# Patient Record
Sex: MALE | Race: WHITE | NOT HISPANIC OR LATINO | Employment: FULL TIME | URBAN - METROPOLITAN AREA
[De-identification: names, ages, dates, MRNs, and addresses within clinical notes are randomized per-mention and may not be internally consistent; named-entity substitution may affect disease eponyms.]

---

## 2022-09-28 ENCOUNTER — OFFICE VISIT (OUTPATIENT)
Dept: FAMILY MEDICINE CLINIC | Facility: CLINIC | Age: 47
End: 2022-09-28

## 2022-09-28 VITALS
BODY MASS INDEX: 28.89 KG/M2 | SYSTOLIC BLOOD PRESSURE: 126 MMHG | OXYGEN SATURATION: 98 % | WEIGHT: 218 LBS | DIASTOLIC BLOOD PRESSURE: 82 MMHG | HEART RATE: 98 BPM | HEIGHT: 73 IN

## 2022-09-28 DIAGNOSIS — F41.9 ANXIETY: ICD-10-CM

## 2022-09-28 DIAGNOSIS — R39.12 WEAK URINE STREAM: ICD-10-CM

## 2022-09-28 DIAGNOSIS — R53.83 FATIGUE, UNSPECIFIED TYPE: ICD-10-CM

## 2022-09-28 DIAGNOSIS — Z12.11 COLON CANCER SCREENING: ICD-10-CM

## 2022-09-28 DIAGNOSIS — R21 RASH: ICD-10-CM

## 2022-09-28 DIAGNOSIS — Z13.220 LIPID SCREENING: ICD-10-CM

## 2022-09-28 DIAGNOSIS — R39.11 URINARY HESITANCY: ICD-10-CM

## 2022-09-28 DIAGNOSIS — Z11.59 NEED FOR HEPATITIS C SCREENING TEST: ICD-10-CM

## 2022-09-28 DIAGNOSIS — Z00.00 ANNUAL PHYSICAL EXAM: Primary | ICD-10-CM

## 2022-09-28 PROBLEM — N20.0 KIDNEY STONES: Status: ACTIVE | Noted: 2022-09-28

## 2022-09-28 PROCEDURE — 99386 PREV VISIT NEW AGE 40-64: CPT

## 2022-09-28 PROCEDURE — 99214 OFFICE O/P EST MOD 30 MIN: CPT

## 2022-09-28 RX ORDER — ESCITALOPRAM OXALATE 10 MG/1
10 TABLET ORAL DAILY
Qty: 60 TABLET | Refills: 0 | Status: SHIPPED | OUTPATIENT
Start: 2022-09-28 | End: 2022-11-29 | Stop reason: SDUPTHER

## 2022-09-30 DIAGNOSIS — R79.89 ELEVATED SERUM CREATININE: Primary | ICD-10-CM

## 2022-09-30 LAB
ALBUMIN SERPL-MCNC: 4.4 G/DL (ref 3.5–5.2)
ALBUMIN/GLOB SERPL: 1.8 G/DL
ALP SERPL-CCNC: 89 U/L (ref 39–117)
ALT SERPL-CCNC: 24 U/L (ref 1–41)
AST SERPL-CCNC: 21 U/L (ref 1–40)
BASOPHILS # BLD AUTO: 0.03 10*3/MM3 (ref 0–0.2)
BASOPHILS NFR BLD AUTO: 0.5 % (ref 0–1.5)
BILIRUB SERPL-MCNC: 0.3 MG/DL (ref 0–1.2)
BUN SERPL-MCNC: 13 MG/DL (ref 6–20)
BUN/CREAT SERPL: 9.9 (ref 7–25)
CALCIUM SERPL-MCNC: 9.7 MG/DL (ref 8.6–10.5)
CHLORIDE SERPL-SCNC: 102 MMOL/L (ref 98–107)
CHOLEST SERPL-MCNC: 178 MG/DL (ref 0–200)
CO2 SERPL-SCNC: 28 MMOL/L (ref 22–29)
CREAT SERPL-MCNC: 1.31 MG/DL (ref 0.76–1.27)
EGFRCR SERPLBLD CKD-EPI 2021: 67.6 ML/MIN/1.73
EOSINOPHIL # BLD AUTO: 0.22 10*3/MM3 (ref 0–0.4)
EOSINOPHIL NFR BLD AUTO: 3.9 % (ref 0.3–6.2)
ERYTHROCYTE [DISTWIDTH] IN BLOOD BY AUTOMATED COUNT: 12.7 % (ref 12.3–15.4)
GLOBULIN SER CALC-MCNC: 2.5 GM/DL
GLUCOSE SERPL-MCNC: 107 MG/DL (ref 65–99)
HBA1C MFR BLD: 5.3 % (ref 4.8–5.6)
HCT VFR BLD AUTO: 48.2 % (ref 37.5–51)
HCV AB S/CO SERPL IA: <0.1 S/CO RATIO (ref 0–0.9)
HDLC SERPL-MCNC: 27 MG/DL (ref 40–60)
HGB BLD-MCNC: 16.2 G/DL (ref 13–17.7)
IMM GRANULOCYTES # BLD AUTO: 0.02 10*3/MM3 (ref 0–0.05)
IMM GRANULOCYTES NFR BLD AUTO: 0.4 % (ref 0–0.5)
LDLC SERPL CALC-MCNC: 107 MG/DL (ref 0–100)
LYMPHOCYTES # BLD AUTO: 1.01 10*3/MM3 (ref 0.7–3.1)
LYMPHOCYTES NFR BLD AUTO: 17.8 % (ref 19.6–45.3)
MCH RBC QN AUTO: 30.9 PG (ref 26.6–33)
MCHC RBC AUTO-ENTMCNC: 33.6 G/DL (ref 31.5–35.7)
MCV RBC AUTO: 91.8 FL (ref 79–97)
MONOCYTES # BLD AUTO: 0.51 10*3/MM3 (ref 0.1–0.9)
MONOCYTES NFR BLD AUTO: 9 % (ref 5–12)
NEUTROPHILS # BLD AUTO: 3.89 10*3/MM3 (ref 1.7–7)
NEUTROPHILS NFR BLD AUTO: 68.4 % (ref 42.7–76)
NRBC BLD AUTO-RTO: 0 /100 WBC (ref 0–0.2)
PLATELET # BLD AUTO: 200 10*3/MM3 (ref 140–450)
POTASSIUM SERPL-SCNC: 4.3 MMOL/L (ref 3.5–5.2)
PROT SERPL-MCNC: 6.9 G/DL (ref 6–8.5)
PSA SERPL-MCNC: 1.05 NG/ML (ref 0–4)
RBC # BLD AUTO: 5.25 10*6/MM3 (ref 4.14–5.8)
SODIUM SERPL-SCNC: 140 MMOL/L (ref 136–145)
TESTOST SERPL-MCNC: 589 NG/DL (ref 264–916)
TRIGL SERPL-MCNC: 252 MG/DL (ref 0–150)
TSH SERPL DL<=0.005 MIU/L-ACNC: 1.49 UIU/ML (ref 0.27–4.2)
VLDLC SERPL CALC-MCNC: 44 MG/DL (ref 5–40)
WBC # BLD AUTO: 5.68 10*3/MM3 (ref 3.4–10.8)

## 2022-10-07 ENCOUNTER — PATIENT ROUNDING (BHMG ONLY) (OUTPATIENT)
Dept: FAMILY MEDICINE CLINIC | Facility: CLINIC | Age: 47
End: 2022-10-07

## 2022-10-07 NOTE — PROGRESS NOTES
A Lela message has been sent to the patient for PATIENT [place] [facility]

## 2022-10-31 ENCOUNTER — E-VISIT (OUTPATIENT)
Dept: FAMILY MEDICINE CLINIC | Facility: TELEHEALTH | Age: 47
End: 2022-10-31
Payer: COMMERCIAL

## 2022-10-31 DIAGNOSIS — J40 BRONCHITIS: Primary | ICD-10-CM

## 2022-10-31 PROCEDURE — BRIGHTMDVISIT: Performed by: NURSE PRACTITIONER

## 2022-10-31 RX ORDER — METHYLPREDNISOLONE 4 MG/1
TABLET ORAL
Qty: 21 TABLET | Refills: 0 | Status: SHIPPED | OUTPATIENT
Start: 2022-10-31

## 2022-10-31 NOTE — EXTERNAL PATIENT INSTRUCTIONS
Note   Bronchitis is viral, so an antibiotic will not help. I have prescribed Mucinex, Tessalon Perles and an inhaler, along with a Medrol dose pack (steroids)   Diagnosis   Acute bronchitis (chest cold)   My name is Keisha Lofton, and I'm a healthcare provider at [facility]. Based on your responses, I see that you have a chest cold, also known as acute bronchitis.   To prevent the spread of illness to others, I recommend that you stay home and away from other people as much as possible while you're sick. When you need to be around other people, consider wearing a face mask.   Here are the main things to know about your current symptoms:    Acute bronchitis gets better on its own within 1 to 3 weeks.    The medications I recommend here will help ease your symptoms.   Based on what you told me in your interview, I haven't prescribed any antibiotics. Antibiotics fight bacteria, not viruses. Your bronchitis is caused by a virus, so antibiotics won't help. They could even make you feel worse as they can cause or worsen nausea, diarrhea, and stomach pain. The following factors suggest that a virus is causing your symptoms:    You've had symptoms for less than 10 days. A bacterial infection is suspected when you've had symptoms longer than 10 days without improvement.    You don't have a sore throat. A bacterial infection like strep throat causes a severe sore throat.    You haven't had a fever. Bacterial infections can cause a high fever.    Your nasal discharge is thin.   Medications   Your pharmacy   [facility] [id number] [id number] [phone]     Prescription   Albuterol sulfate HFA (90mcg/puff): Inhale 2 puffs every 6 hours as needed for wheezing. If symptoms are severe, may use as often as every 4 hours.   Benzonatate (200mg): Take 1 capsule by mouth 3 times a day as needed for cough. Do not chew or cut these capsules.   Mucus Relief ER oral tablet, extended release (600mg): Take  1 tablet by mouth every 12 hours as needed for cough and congestion.   About your diagnosis   The bronchial tubes carry air to the lungs. Acute bronchitis happens when these tubes become irritated and inflamed. Smoke and air pollution can cause acute bronchitis, but it's usually caused by a virus. The viruses that cause the common cold or the flu can also cause bronchitis.   Common symptoms of acute bronchitis include coughing up mucus or phlegm, wheezing, and chest tightness. Cold symptoms along with fatigue, body aches, difficulty sleeping, or decreased appetite, are also common.   What to expect   If you follow this treatment plan, you should feel better in 1 to 3 weeks.   When to seek care   Call us at [phone]   with any sudden or unexpected symptoms.    Fever that measures over 103F or continues for more than 3 days.    A worsening headache.    You develop severe shortness of breath or chest tightness.    Your sinus pain continues for 10 days or more, without improvement.    Swallowing becomes extremely difficult or impossible.    More than 5 episodes of diarrhea in a day.    More than 5 episodes of vomiting in a day.    Coughing up red or bloody mucus.    Severe stomach pain.    Symptoms that get better for a few days, and then suddenly get worse.   You mentioned having chest pain. If you develop any of the following, call 911 or go immediately to your nearest emergency room:    A feeling of pressure on your chest    Pain that gets worse with physical activity    Pain that feels like it is radiating to the shoulders, neck, arm, or jaw   Other treatment    Rest! Your body needs rest to recover and fight infection.    Drink plenty of water to stay hydrated.    Use a clean humidifier or a cool-mist vaporizer in your room at night. Breathing humid air may help you feel better.    Try non-prescription saline nasal sprays to help your nasal symptoms.    Try using a Neti Pot to flush out your stuffy nose and  sinuses. Neti Pots are available at any drugstore without a prescription.    Gargle with salt water several times a day to help your throat feel better. Cough drops and throat lozenges may provide extra relief. A teaspoon of honey stirred into warm water or weak tea can help soothe a sore throat and cough.    Avoid smoke and air pollution. Smoke can make infections worse. You mentioned that you smoke every day. I encourage you to quit, even for a week or two. Quitting will help your symptoms improve faster.   Prevention    Avoid close contact with other people when you're sick.    Cover your mouth and nose when you cough or sneeze. Use a tissue or cough into your elbow. Make sure that used tissues go directly into the trash.    Avoid touching your eyes, nose, or mouth while you're sick.    Wash your hands often, especially after coughing, sneezing, or blowing your nose. If soap and water are not available, use an alcohol-based hand .    If you or someone in your home or workplace is sick, disinfect commonly used items. This includes door handles, tables, computers, remotes, and pens.    Coronavirus (COVID-19) information   Common symptoms of COVID-19 include fever, cough, shortness of breath, fatigue, muscle or body aches, headaches, new loss of sense of taste or smell, sore throat, stuffy or runny nose, nausea or vomiting, and diarrhea. Most people who get COVID-19 have mild symptoms and can rest at home until they get better. Elderly people and those with chronic medical problems may be at risk for more serious complications.   FAQs about the COVID-19 vaccine   There are four authorized COVID-19 vaccines: Mello & Mello's Xenia Vaccine (J&J/Xenia), Moderna, Novavax, and Pfizer-Dipe[employer] (Pfizer). The J&J/Xenia and Novavax vaccines are approved for use in people aged 18 and older. The Moderna and Pfizer vaccines are approved for those aged 6 months and older. All four are available at no cost. Even  if you don't have health insurance, you can still get the COVID-19 vaccine for free.   Which vaccine is the best? Which vaccine should I get?   All four vaccines are highly effective. Even if you get COVID-19 after being vaccinated, all of the vaccines help prevent severe disease, hospitalization, and complications.   Most people should get whichever vaccine is first available to them. However, women younger than 50 years old should consider the rare risk of blood clots with low platelets after vaccination with the J&J/Updox vaccine. This risk hasn't been seen with the other three vaccines.   Are the vaccines safe?   Yes. Hundreds of millions of people in the [place] have already safely received COVID-19 vaccines under the most intense safety monitoring in the history of the [place].   Do I need the vaccine if I've already had COVID?   Yes. Vaccination helps protect you even if you've already had COVID.   If you had COVID-19 and had symptoms, wait to get vaccinated until you've recovered and completed your isolation period.   If you tested positive for COVID-19 but did not have symptoms, you can get vaccinated after 5 full days have passed since you had a positive test, as long as you don't develop symptoms.   How many doses of the vaccine do I need?   Visit www.cdc.gov/coronavirus/2019-ncov/vaccines/stay-up-to-date.html   to find out how to stay up to date with your COVID-19 vaccines.   I'm immunocompromised. How many doses of the vaccine do I need?   For information on how immunocompromised people can stay up to date with their COVID-19 vaccines, visit www.cdc.gov/coronavirus/2019-ncov/vaccines/recommendations/immuno.html  .   What are the common side effects of the vaccine?   A sore arm, tiredness, headache, and muscle pain may occur within two days of getting the vaccine and last a day or two. For the Moderna or Pfizer vaccines, side effects are more common after the second dose. People over the age of 55 are less  likely to have side effects than younger people.   After I'm up to date on vaccines, can I still get or spread COVID?   Yes, you can still get COVID, but your disease should be milder. And your risk of serious illness, hospitalization, and complications will be much lower, especially if you're up to date. Unfortunately, you can still spread COVID if you've been vaccinated. That's why it's important to follow isolation guidelines if you get sick or test positive.   After I'm up to date on vaccines, can I go back to normal?   You should still wear a mask indoors in public if:    It's required by laws, rules, regulations, or local guidance.    You have a weakened immune system.    Your age puts you at increased risk of severe disease.    You have a medical condition that puts you at increased risk of severe disease.    Someone in your household has a weakened immune system, is at increased risk for severe disease, or is unvaccinated.    You're in an area of high transmission.   Where can I get a COVID-19 vaccine?   Visit [facility]'s website for more information. To find a COVID-19 vaccination site near you, visit www.vaccines.gov/  , call [phone]  , or text your zip code to 228496 (MyAppConverter). Message and data rates may apply.   What about travel?   Travel increases your risk of exposure to COVID-19. For more information, see www.cdc.gov/coronavirus/2019-ncov/travelers/index.html  .   I've had close contact with someone who has COVID. Do I need to quarantine, and if so, for how long?   For the most current answer, including a calculator to determine whether you need to stay home and for how long, visit www.cdc.gov/coronavirus/2019-ncov/your-health/quarantine-isolation.html  .   I've tested positive for COVID. How long do I need to isolate?   For the latest recommendations, including a calculator to determine how long you need to stay home, visit  www.cdc.gov/coronavirus/2019-ncov/your-health/quarantine-isolation.html  .   What if I develop symptoms that might be from COVID?   For the latest recommendations on what to do if you're sick, including when to seek emergency care, visit www.cdc.gov/coronavirus/2019-ncov/if-you-are-sick/steps-when-sick.html  .    Flu vaccine information   Who should get a flu vaccine?   Everyone 6 months of age and older should get a yearly flu vaccine.   When should I get vaccinated?   You should get a flu vaccine by the end of October. Once you're vaccinated, it takes about two weeks for antibodies to develop and protect you against the flu. That's why it's important to get vaccinated as soon as possible.   After October, is it too late to get vaccinated?   No. You should still get vaccinated. As long as the flu viruses are still in your community, flu vaccines will remain available, even into January of next year or later.   Why do I need a flu vaccine EVERY year?   Flu viruses are constantly changing, so flu vaccines are usually updated from one season to the next. Your protection from the flu vaccine also lessens over time.   Is the flu vaccine safe?   Yes. Over the last 50 years, hundreds of millions of Americans have safely received the flu vaccines.   What are the side effects of flu vaccines?   You CANNOT get the flu from a flu vaccine. Common side effects of the flu shot include soreness, redness and/or swelling where the shot was given, low grade fever, and aches. Common side effects of the nasal spray flu vaccine for adults include runny nose, headaches, sore throat, and cough. For children, side effects include wheezing, vomiting, muscle aches, and fever.   Does the flu vaccine increase your risk of getting COVID-19?   No. There is no evidence that getting a flu vaccine increases your risk of getting COVID-19.   Is it safe to get the flu vaccine along with a COVID-19 vaccine?   Yes. It's safe to get the flu vaccine with  a COVID-19 vaccine or booster.   Contact your healthcare provider TODAY for details on when and where to get your flu vaccine.   Your provider   Your diagnosis was provided by Keisha Lofton, a member of your trusted care team at [facility].   If you have any questions, call us at [phone]  .

## 2022-10-31 NOTE — E-VISIT TREATED
Chief Complaint: Coronavirus (COVID-19), cold, sinus pain, allergy, or flu   Patient introduction   Patient is 47-year-old male with cough, congestion, nasal discharge, sweats, chills, myalgia, fatigue, and diarrhea that started 3 to 5 days ago. Regarding date of symptom onset, patient writes: 10/27/22.   COVID-19 exposure, testing history, and vaccination status:    No known exposure to a person with a confirmed or suspected case of COVID-19.    No recent travel outside of their local community.    Patient had a self-test within the last week. Patient specifies date of test as 10/30/22. Test result was negative.    Received 2 doses of the COVID-19 vaccine (Pfizer, Pfizer).   Received their most recent dose of the vaccine more than 14 days ago.   Risk factors for severe disease from COVID-19 infection:    BMI >= 25.    Smokes tobacco daily.   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   Patient did not request an excuse note.   General presentation   Symptoms came on suddenly.   Fever:    No fever.   Sinus and nasal symptoms:    Nasal discharge.    Yellow nasal drainage.    Nasal drainage is thin.    Congestion with sinus pain or pressure on or around the nose.    Patient first noticed sinus pain less than 5 days ago.    Sinus pain is worse with Valsalva.    No itchy nose or sneezing.    No postnasal drip.    No history of unhealed nasal septal ulcer/nasal wound.    No history of  antibiotic treatment for sinus infection in the last year.    No history of deviated septum or nasal polyps.   Throat symptoms:    No sore throat.   Head and body aches:    Sweats.    Chills.    Myalgia.    Fatigue.    No headache.   Cough:    Cough is worse in the morning.    Cough is not productive of sputum.   Wheezing and shortness of breath:    Has previously used an albuterol inhaler for URI, bronchitis, or pneumonia.    Has previously used a steroid inhaler for URI, bronchitis, or pneumonia.    Not using an albuterol inhaler for current symptoms because they do not have any available.    No COPD diagnosis.    No asthma diagnosis.    No wheezing.    No shortness of breath.   Chest pain:    Has chest pain, but only when coughing.    Chest pain is not the patient's chief complaint.    [place] Heart Score (MHS): 0, low risk of CAD. Assigning 1 point to each of 5 criteria (female >= 65 years old or male >= 55 years, known CAD, pain worse with exercise, pain not reproducible with palpation, and patient assumes pain is cardiogenic), the MHS is a validated clinical decision rule used to rule out coronary artery disease in primary care patients with chest pain.   Ear symptoms:    None.   Dizziness:    Mild dizziness that does not interfere with daily activities.   Allergies:    No history of allergies.   Flu exposure:    Recent distant-proximity exposure to a person with a confirmed flu diagnosis.    Has not had a flu vaccine this season.   Patient is taking over-the-counter medications for current symptoms, including acetaminophen and dextromethorphan.   Review of red flags/alarm symptoms:    No changes in alertness or awareness.    No decreased urination.   Risk factors for antibiotic resistance:    Smokes tobacco daily.   Self-exam:    Height: 185 centimeters    Weight: 94.3 kilograms    No difficulty moving their chin toward their chest.    Neck lymph nodes feel normal.    Has not taken antibiotics for similar  symptoms within the past month.   Current medications   Currently taking escitalopram 10 MG tablet.   Medication allergies   None.   Medication contraindication review   No history of anaphylactic reaction to beta-lactam antibiotics; aspirin triad; blood dyscrasia; bone marrow depression; catecholamine-releasing paraganglioma; coronary artery disease; coagulation disorder; congenital long QT syndrome; depression; electrolyte abnormalities; fungal infection; GI bleeding; GI obstruction; G6PD deficiency; heart arrhythmia; hypertension; mononucleosis; myasthenia; recent myocardial infarction; NSAID-induced asthma/urticaria; Parkinson's disease; pheochromocytoma; porphyria; Reye syndrome; seizure disorder; ulcerative colitis; and urinary retention.   No history of metoclopramide-associated dystonic reaction and tardive dyskinesia.   No known history of amoxicillin-clavulanate-associated cholestatic jaundice or hepatic impairment.   No known history of azithromycin-associated cholestatic jaundice or hepatic impairment.   Past medical history   Immune conditions: No immunocompromising conditions. No history of cancer.   Social history   Smokes tobacco daily.   Assessment   Acute bronchitis. Ruled out: Traumatic laryngitis.   This is the likely diagnosis based on patient's interview responses   Plan   Medications:    albuterol sulfate HFA 90 mcg/actuation aerosol inhaler RX 90mcg/puff 2 puffs inhaled q6h PRN 10d for wheezing. If symptoms are severe, may use as often as every 4 hours. Amount is 18 g.    benzonatate 200 mg capsule RX 200mg 1 cap PO tid PRN 7d for cough. Do not chew or cut these capsules. Amount is 21 cap.    Mucus Relief  mg tablet, extended release RX 600mg 1 tab PO q12h PRN 7d for cough and congestion. Amount is 14 tab.   The patient's prescriptions will be sent to:   [facility] [id number]   [id number] [address]   Phone: [phone]     Fax: [phone]   Education:     Condition and causes    Prevention    Treatment and self-care    When to call provider   ----------   Electronically signed by LUIS Scott on 2022-10-31 at 15:30PM   ----------   Patient Interview Transcript:   Please carefully consider each question and answer as best you can. This helps your provider give you the best care. Which of these symptoms are bothering you? Select all that apply.    Cough    Stuffed-up nose or sinuses    Runny nose    Sweats    Chills    Muscle or body aches    Fatigue or tiredness    Diarrhea   Not selected:    Shortness of breath    Fever    Itchy or watery eyes    Itchy nose or sneezing    Loss of smell or taste    Sore throat    Hoarse voice or loss of voice    Headache    Nausea or vomiting    I don't have any of these symptoms   Since your current symptoms started, have you been tested for COVID-19? Select one.    Yes   Not selected:    No   When was your most recent COVID-19 test? Select one.    Within the last week (specify date as MM/DD/YY): 10/30/22   Not selected:    7 to 14 days ago    15 to 30 days ago    More than 1 month ago   What type of COVID-19 test did you most recently have? There are two types of COVID-19 tests: - Viral tests check if you're currently infected with COVID-19. For these tests, a nose swab or saliva sample is taken. Viral tests include self-tests and tests done at a doctor's office, lab, or testing site. - Antibody tests check if you've been infected in the past. For these tests, your blood is drawn. Antibody tests can only be done at a doctor's office, lab, or testing site. Select one.    Viral self-test   Not selected:    Viral test at a doctor's office, lab, or testing site    Antibody test   What was the result of your most recent COVID-19 test? Select one.    Negative   Not selected:    Positive    I'm not sure   Have you gotten the COVID-19 vaccine? Select one.    Yes   Not selected:    No   How many total doses of the COVID-19 vaccine have  "you gotten? This includes boosters as well as additional doses for those who are immunocompromised. Select one.    2 doses   Not selected:    1 dose    3 doses    4 doses    5 doses   1st dose    Pfizer   Not selected:    J&J/Xenia    Moderna    Novavax   2nd dose    Pfizer   Not selected:    J&J/Xenia    Moderna    Novavax   When did you get your most recent dose of the COVID-19 vaccine?    More than 14 days ago   Not selected:    Less than 48 hours (2 days) ago    48 to 72 hours (3 days) ago    3 to 5 days ago    5 to 7 days ago    7 to 14 days ago   In the last 14 days, have you traveled outside of your local community? This includes travel by car, RV, bus, train, or plane. Travel increases your chances of getting and spreading COVID-19. Select one.    No   Not selected:    Yes   In the last 14 days, have you had close contact with someone who has coronavirus (COVID-19)? \"Close contact\" means any of these: - Living in the same household as someone with COVID-19. - Caring for someone with COVID-19. - Being within 6 feet of someone with COVID-19 for a total of at least 15 minutes over a 24-hour period. For example, three 5-minute exposures for a total of 15 minutes. - Being in direct contact with respiratory droplets from someone with COVID-19 (being coughed on, kissing, sharing utensils). Select one.    No, not that I know of   Not selected:    Yes, a confirmed case    Yes, a suspected case   When did your current symptoms start? Select one.    3 to 5 days ago   Not selected:    Less than 48 hours ago    6 to 9 days ago    10 to 14 days ago    2 to 4 weeks ago    More than a month ago   Do you know the exact date your symptoms started? If so, enter the date as MM/DD/YY. Select one.    Yes (specify): 10/27/22   Not selected:    No   Did your symptoms come on suddenly or gradually? Select one.    Suddenly   Not selected:    Gradually    I'm not sure   Do you cough so hard that it's made you gag or vomit? By gag, " "we mean has your coughing made you choke or dry heave? Select all that apply.    Yes, my coughing has made me gag    Yes, my coughing has made me vomit   Not selected:    No   When is your cough the worst? Select all that apply.    In the morning, or when I wake up   Not selected:    During the day    At nighttime, or while I'm sleeping    I haven't noticed a difference depending on time of day   Are you coughing up mucus or phlegm? Select one.    No, my cough is dry   Not selected:    Yes, a little    Yes, a lot   You mentioned having a stuffy nose or sinus congestion. Do you feel pain or pressure in your sinuses?    Yes   Not selected:    No   Where do you feel sinus pain or pressure?    Behind my nose   Not selected:    In my forehead    Around my eyes    In my cheeks    In my upper teeth or jaw    I'm not sure   When did you first notice your sinus pain or pressure? Select one.    Less than 5 days ago   Not selected:    5 to 9 days ago    10 to 14 days ago    2 to 4 weeks ago    1 month ago or longer   Does coughing, sneezing, or leaning forward make your sinuses feel worse? Select one.    Yes   Not selected:    No   What color is your nasal drainage? Select one.    Yellow   Not selected:    Clear    White    Green    My nose is stuffed but not draining or running    I'm not sure   Is your nasal drainage thick or thin? Select one.    Thin   Not selected:    Thick   Is there any drainage (mucus) going down the back of your throat? This kind of drainage is also called \"postnasal drip.\" Select one.    No, not that I know of   Not selected:    Yes   Since your symptoms started, have you felt dizzy? Select one.    Yes, but I can continue with my regular daily activities   Not selected:    Yes, and it makes it hard to stand, walk, or do daily activities    No   Do you have chest pain? You might also feel it as discomfort, aching, tightness, or squeezing in the chest. Select one.    Yes   Not selected:    No   Which of " these is true of your chest pain? Select one.    My chest hurts only when I cough   Not selected:    My chest hurts even when I'm not coughing   Have you urinated at least 3 times in the last 24 hours? Select one.    Yes   Not selected:    No   Changes in alertness or awareness may mean you need emergency care. Since your symptoms started, have you had any of these? Select all that apply.    None of the above   Not selected:    Confusion    Slurred speech    Not knowing where you are or what day it is    Difficulty staying conscious    Fainting or passing out   Do your symptoms include a whistling sound, or wheezing, when you breathe? Select one.    No   Not selected:    Yes    I'm not sure   Do you have any of these symptoms in your ear(s)? Select all that apply.    None of the above   Not selected:    Pain    Pressure    Fullness    Crackling or popping    Plugged or blocked sensation   Can you move your chin toward your chest?    Yes   Not selected:    No, my neck is too stiff   Are your glands/lymph nodes swollen, or does it hurt when you touch them?    No, not that I can tell   Not selected:    Yes   In the past week, has anyone around you (such as at school, work, or home) had a confirmed diagnosis of the flu? A confirmed diagnosis means that a nose swab was done to verify a flu infection. Select all that apply.    I've been in the same building as someone who has the flu   Not selected:    I live with someone who has the flu    I've been within touching distance of someone who has the flu    I've walked by, or sat about 3 feet away from, someone who has the flu    No, not that I know of   Have you ever been diagnosed with asthma? Select one.    No   Not selected:    Yes   Have you ever been prescribed albuterol to use for wheezing, cough, or shortness of breath caused by a cold, bronchitis, or pneumonia? Albuterol (ProAir, Proventil, Ventolin) is prescribed as an inhaler or a solution to be used with a  nebulizer machine. Select one.    Yes   Not selected:    No, not that I know of   Have you ever been prescribed a steroid inhaler to use for wheezing, cough, or shortness of breath caused by a cold, bronchitis, or pneumonia? Some examples of steroid inhalers include Pulmicort, Flovent, Qvar, and Alvesco. Select one.    Yes   Not selected:    No, not that I know of   Have you used albuterol for your current symptoms? This includes both albuterol inhalers and albuterol solution in a nebulizer machine. Select one.    No, I don't have any, or it's    Not selected:    Yes    No, I don't feel like I need it   Do you need a refill of albuterol? Select one.    No   Not selected:    Yes   Have you ever been diagnosed with chronic obstructive pulmonary disease (COPD)? Select one.    No, not that I know of   Not selected:    Yes   In the past month, have you taken antibiotics for similar symptoms? Examples of antibiotics include amoxicillin, amoxicillin-clavulanate (Augmentin), penicillin, cefdinir (Omnicef), doxycycline, and clindamycin (Cleocin). Select one.    No   Not selected:    Yes   In the last year, how many times were you treated with antibiotics for a sinus infection? Select one.    None   Not selected:    1 to 3 times    4 or more times   Have you been diagnosed with a deviated septum or nasal polyps? The nose is divided into two nostrils by the septum. A crooked septum is called a deviated septum. Nasal polyps are growths inside the nose or sinuses. Select one.    No, not that I know of   Not selected:    Yes, but I had surgery to treat them    Yes, I have a deviated septum    Yes, I have nasal polyps    Yes, I have a deviated septum and nasal polyps   Do you have a sore inside your nose that won't heal? Select one.    No, not that I know of   Not selected:    Yes   Do you have allergies (pollen, dust mites, mold, animal dander)? Select one.    No, not that I know of   Not selected:    Yes   Have you had a  flu shot this season? Select one.    No, not that I know of   Not selected:    Yes, less than 2 weeks ago    Yes, 2 to 4 weeks ago    Yes, 1 to 3 months ago    Yes, 3 to 6 months ago    Yes, more than 6 months ago   The flu and COVID-19 can be more serious for people with certain conditions or characteristics. These questions help us figure out if you or anyone you live with is at higher risk for complications from these infections. Do either of these statements apply to you? Select all that apply.    None of the above   Not selected:    I'm  or Native [place]    I'm a healthcare worker   Do you smoke tobacco? Select one.    Yes, every day   Not selected:    Yes, some days    No, I quit    No   Do you have any of these conditions? Select all that apply.    None of the above   Not selected:    Chronic lung disease, such as cystic fibrosis or interstitial fibrosis    Heart disease, such as congenital heart disease, congestive heart failure, or coronary artery disease    Disorder of the brain, spinal cord, or nerves and muscles, such as dementia, cerebral palsy, epilepsy, muscular dystrophy, or developmental delay    Metabolic disorder or mitochondrial disease    Cerebrovascular disease, such as stroke or another condition affecting the blood vessels or blood supply to the brain    Down syndrome    Mood disorder, including depression or schizophrenia spectrum disorders    Substance use disorder, such as alcohol, opioid, or cocaine use disorder    Tuberculosis   Do you live in a group care setting? Examples include: - Nursing home - Residential care - Psychiatric treatment facility - Group home - Dormitory - Board and care home - Homeless shelter - Foster care setting Select one.    No   Not selected:    Yes   Are you a healthcare worker? Select one.    No   Not selected:    Yes   People with a very high body mass index (BMI) are at higher risk for developing complications from the flu and severe illness  from COVID-19. To determine your BMI, we need to know your weight and height. Please enter your weight (in pounds).    Weight   Please enter your height.    Height   Do you have any of these conditions that can affect the immune system? Scroll to see all options. Select all that apply.    None of these   Not selected:    History of bone marrow transplant    Chronic kidney disease    Chronic liver disease (including cirrhosis)    HIV/AIDS    Inflammatory bowel disease (Crohn's disease or ulcerative colitis)    Lupus    Moderate to severe plaque psoriasis    Multiple sclerosis    Rheumatoid arthritis    Sickle cell anemia    Alpha or beta thalassemia    History of solid organ transplant (kidney, liver, or heart)    History of spleen removal    An autoimmune disorder not listed here    A condition requiring treatment with long-term use of oral steroids (such as prednisone, prednisolone, or dexamethasone)   Have you ever been diagnosed with cancer? Select one.    No   Not selected:    Yes, I have cancer now    Yes, but I'm in remission   Do any of these apply to you? Select all that apply.    None of the above   Not selected:    I've been hospitalized within the last 5 days    I have diabetes    I'm in close contact with a child in    Do any of these apply to the people who live with you? Select all that apply.    None of the above   Not selected:    A child under the age of 5    An adult 65 or older    A person who is pregnant    A person who has given birth, had a miscarriage, had a pregnancy loss, or had an  in the last 2 weeks    An  or Native [place]   Does any member of your household have any of these medical conditions? Select all that apply.    None of the above   Not selected:    Asthma    Disorders of the brain, spinal cord, or nerves and muscles, such as dementia, cerebral palsy, epilepsy, muscular dystrophy, or developmental delay    Chronic lung disease, such as COPD or cystic  fibrosis    Heart disease, such as congenital heart disease, congestive heart failure, or coronary artery disease    Cerebrovascular disease, such as stroke or another condition affecting the blood vessels or blood supply to the brain    Blood disorders, such as sickle cell disease    Diabetes    Metabolic disorders such as inherited metabolic disorders or mitochondrial disease    Kidney disorders    Liver disorders    Weakened immune system due to illness or medications such as chemotherapy or steroids    Children under the age of 19 who are on long-term aspirin therapy    Extreme obesity (BMI > 40)   Do you have any of these conditions? Scroll to see all options. Select all that apply.    None of the above   Not selected:    Aspirin triad (also known as Samter's triad or ASA triad)    Asthma or hives from taking aspirin or other NSAIDs, such as ibuprofen or naproxen    Blockage or narrowing of the blood vessels of the heart    Blood dyscrasia, such anemia, leukemia, lymphoma, or myeloma    Bone marrow depression    Catecholamine-releasing paraganglioma    Blood clotting disorder    Congenital long QT syndrome    Depression    Difficulty urinating or completely emptying your bladder    Uncorrected electrolyte abnormalities    Fungal infection    Gastrointestinal (GI) bleeding    Gastrointestinal (GI) obstruction    G6PD deficiency    Recent heart attack    High blood pressure    Irregular heartbeat or heart rhythm    Mononucleosis (mono)    Myasthenia gravis    Parkinson's disease    Pheochromocytoma    Reye syndrome    Seizure disorder    Ulcerative colitis   Have you ever had either of these conditions? Select all that apply.    No   Not selected:    Metoclopramide-associated dystonic reaction    Tardive dyskinesia   Just a few more questions about medications, and then you're finished. Have you used any non-prescription medications or nasal sprays for your current symptoms? Examples include saline sprays,  decongestants, NyQuil, and Tylenol. Select one.    Yes   Not selected:    No   Which of these non-prescription medications have you tried? Scroll to see all options. Select all that apply.    Acetaminophen (Tylenol)    Dextromethorphan (Delsym, Robitussin, Vicks DayQuil Cough)   Not selected:    Budesonide (Rhinocort)    Cetirizine (Zyrtec)    Chlorpheniramine (Aller-chlor, Chlor-Trimeton)    Cromolyn (NasalCrom)    Diphenhydramine (Benadryl)    Fexofenadine (Allegra)    Fluticasone (Flonase)    Guaifenesin (Mucinex)    Guaifenesin/dextromethorphan (Delsym DM, Mucinex DM, Robitussin DM)    Ibuprofen (Advil, Motrin, Midol)    Ketotifen (Alaway, Zaditor)    Loratadine (Alavert, Claritin)    Naphazoline-pheniramine (Naphcon-A, Opcon-A, Visine-A)    Omeprazole (Prilosec)    Oxymetazoline (Afrin)    Phenylephrine (Sudafed)    Triamcinolone (Nasacort)    None of the above   Have you taken any monoamine oxidase inhibitor (MAOI) medications in the last 14 days? Examples include rasagiline (Azilect), selegiline (Eldepryl, Zelapar), isocarboxazid (Marplan), phenelzine (Nardil), and tranylcypromine (Parnate). Select one.    No, not that I know of   Not selected:    Yes   Do you take Kynmobi or Apokyn (apomorphine)? Select one.    No   Not selected:    Yes   Are you still taking these medications listed in your medical record? If you're not taking any of these, click Next. Select all that apply.    escitalopram 10 MG tablet   Are you taking any other medications, vitamins, or supplements? Select one.    No   Not selected:    Yes   Have you ever had an allergic or bad reaction to any medication? Select one.    No   Not selected:    Yes   Are you allergic to milk or to the proteins found in milk (for example, whey or casein)? A milk allergy is different from lactose intolerance. Select one.    No, not that I know of   Not selected:    Yes   Have you ever had jaundice or liver problems as a result of taking amoxicillin-clavulanate  (Augmentin)? Jaundice is a condition in which the skin and the whites of the eyes turn yellow. Select all that apply.    No, not that I know of   Not selected:    Yes, jaundice    Yes, liver problems   Have you ever had jaundice or liver problems as a result of taking azithromycin (Zithromax, Zmax)? Jaundice is a condition in which the skin and the whites of the eyes turn yellow. Select all that apply.    No, not that I know of   Not selected:    Yes, jaundice    Yes, liver problems   Do you need a doctor's note? A doctor's note confirms that you received care today and states when you can return to school or work. It does not contain information about your diagnosis or treatment plan. Your provider will make the final decision on whether to give you a doctor's note and for how long. Doctor's notes CANNOT be backdated. We can't provide medical leave paperwork through this type of visit. If more paperwork is needed to request time off, contact your primary care provider. Select one.    No   Not selected:    Today only (1 day)    Today and tomorrow (2 days)    3 days    5 days    7 days    10 days    14 days   Is there anything else you'd like to tell us about your symptoms?    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

## 2022-11-29 ENCOUNTER — TELEPHONE (OUTPATIENT)
Dept: FAMILY MEDICINE CLINIC | Facility: CLINIC | Age: 47
End: 2022-11-29

## 2022-11-29 DIAGNOSIS — J06.9 URI WITH COUGH AND CONGESTION: Primary | ICD-10-CM

## 2022-11-29 DIAGNOSIS — F41.9 ANXIETY: ICD-10-CM

## 2022-11-29 RX ORDER — ESCITALOPRAM OXALATE 10 MG/1
10 TABLET ORAL DAILY
Qty: 60 TABLET | Refills: 0 | Status: SHIPPED | OUTPATIENT
Start: 2022-11-29

## 2022-11-29 RX ORDER — DEXTROMETHORPHAN HYDROBROMIDE AND PROMETHAZINE HYDROCHLORIDE 15; 6.25 MG/5ML; MG/5ML
5 SYRUP ORAL 4 TIMES DAILY PRN
Qty: 118 ML | Refills: 0 | Status: SHIPPED | OUTPATIENT
Start: 2022-11-29

## 2022-11-29 RX ORDER — GUAIFENESIN 600 MG/1
1200 TABLET, EXTENDED RELEASE ORAL 2 TIMES DAILY
Qty: 20 TABLET | Refills: 0 | Status: SHIPPED | OUTPATIENT
Start: 2022-11-29 | End: 2022-12-04

## 2022-11-29 NOTE — TELEPHONE ENCOUNTER
I have sent in medication for cough and congestion.   I ordered Xray, can  order and take to [facility] or if prefers go to [facility]

## 2022-11-29 NOTE — TELEPHONE ENCOUNTER
JULIANNE IN [facility]. PHARMACY IS IN CHART.   ALSO PT IS REQUESTING TO BE TESTED FOR PNEUMONIA.

## 2022-11-29 NOTE — TELEPHONE ENCOUNTER
Most likely viral as long as no fever. Usually cough may linger for up to 6 weeks but should overall feel better.   I can send in treatment for cough if ok with this. Where does he want it to go?

## 2022-11-29 NOTE — TELEPHONE ENCOUNTER
Caller: Alexis Huntley    Relationship: Self    Best call back number: [phone]    Who are you requesting to speak with (clinical staff, provider,  specific staff member): LUIS MELENDEZ OR MA    What was the call regarding: PATIENT STATES THAT HE DID AN ONLINE SCREENING, AND WAS PRESCRIBED MEDICATION TO TREAT BRONCHITIS. HE COMPLETED THE MEDICATION, AND HIS SYMPTOMS SEEMED TO CLEAR UP. HE RECENTLY WENT OF OF TOWN, AND THE SAME COUGH CAME BACK. HE HAS HAD A SEVERE AND PERSISTENT COUGH FOR ABOUT THE LAST WEEK. REQUESTS A CALL BACK TO DISCUSS FURTHER CONCERNS AND SUGGESTED TREATMENT    Do you require a callback: YES

## 2022-11-29 NOTE — TELEPHONE ENCOUNTER
Caller: Alexis Huntley    Relationship: Self    Best call back number: [phone]    Requested Prescriptions:   Requested Prescriptions     Pending Prescriptions Disp Refills   • escitalopram (Lexapro) 10 MG tablet 60 tablet 0     Sig: Take 1 tablet by mouth Daily.        Pharmacy where request should be sent: [facility] [address] [address] [address] - [phone]  - [phone] FX     Additional details provided by patient: PATIENT STATES THAT HE HAS A FEW DAYS OF MEDICATION REMAINING    Does the patient have less than a 3 day supply:  [x] Yes  [] No    Would you like a call back once the refill request has been completed: [] Yes [x] No    If the office needs to give you a call back, can they leave a voicemail: [] Yes [x] No    Adriana Crenshaw Rep   11/29/22 08:49 EST

## 2022-11-30 ENCOUNTER — TELEPHONE (OUTPATIENT)
Dept: FAMILY MEDICINE CLINIC | Facility: CLINIC | Age: 47
End: 2022-11-30

## 2023-11-20 ENCOUNTER — E-VISIT (OUTPATIENT)
Dept: FAMILY MEDICINE CLINIC | Facility: TELEHEALTH | Age: 48
End: 2023-11-20
Payer: COMMERCIAL

## 2023-11-21 NOTE — E-VISIT TREATED
Chief Complaint: Cold, flu, COVID, sinus, hay fever, or seasonal allergies   Patient introduction   Patient is 48-year-old male with cough, shortness of breath, congestion, nasal discharge, sore throat, headache, chills, and diarrhea that started 6 to 9 days ago. Regarding date of symptom onset, patient writes: 11/12/23.   COVID-19 testing history, vaccination status, and exposure:    Patient did a self-test within the last 24 hours. Test result was negative.    Received 2 doses of the COVID-19 vaccine (Pfizer, Pfizer). Received their most recent dose of the vaccine more than 14 days ago.    No high-risk (household) exposure to COVID-19 within the last 14 days.    No travel outside local community within the last 14 days.   Risk factors for severe disease from COVID-19 infection    Higher risk for severe disease from COVID-19 because of BMI >= 25.    Mostly sedentary lifestyle.    Smokes tobacco daily.   Warning. The following may warrant further investigation:    Headache described as severe (7 to 9 on a scale of 1 to 10).   General presentation   No improvement of symptoms. Symptoms came on suddenly.   Fever:    No fever.   Sinus and nasal symptoms:    Nasal discharge.    Yellow nasal drainage.    Nasal drainage is thick.    Sinus pain or pressure on or around the forehead, eyes, and nose.    Patient first noticed sinus pain 5 to 9 days ago.    Sinus pain is worse with Valsalva.    No itchy nose or sneezing.    No postnasal drip.    No history of unhealed nasal septal ulcer/nasal wound.    No history of antibiotic treatment for sinus infection in the last year.    No history of deviated septum or nasal polyps.   Throat symptoms:    Sore throat.    Patient can swallow liquids and solid foods with ease.   Head and body aches:    Headache described as severe (7 to 9 on a scale of 1 to 10).    Chills.    No sweats.    No myalgia.    No fatigue.   Cough:    Cough is worse in the morning.    Cough is mildly  productive of sputum.    Describes color of sputum as yellow.   Wheezing and shortness of breath:    Moderate shortness of breath that does not affect ADLs. Despite shortness of breath, patient can speak normally. Despite shortness of breath, patient can take a full, deep breath (inhaling for 3 to 4 seconds).    Patient has a home pulse oximeter. Per patient, readings for O2 saturation percentage and heart rate BPM are: 96/100.    Has previously used an albuterol inhaler for URI, bronchitis, or pneumonia.    Not using an albuterol inhaler for current symptoms because they do not have any available.    No COPD diagnosis.    No asthma diagnosis.    No wheezing.    No previous steroid inhaler use for URIs, bronchitis, or pneumonia.   Chest pain:    Has chest pain, but only when coughing.    Chest pain is not the patient's chief complaint.    [place] Heart Score (MHS): 0, low risk of CAD. Assigning 1 point to each of 5 criteria (female >= 65 years old or male >= 55 years, known CAD, pain worse with exercise, pain not reproducible with palpation, and patient assumes pain is coming from their heart), the MHS is a validated clinical decision rule used to rule out coronary artery disease in primary care patients with chest pain.   Ear symptoms:    None.   Dizziness:    Mild dizziness that does not interfere with daily activities.   Allergies:    No history of allergies.   Flu exposure:    No recent known exposure to a person with a confirmed flu diagnosis.    Has not had a flu vaccine this season.   Patient is taking over-the-counter medications for current symptoms, including acetaminophen, dextromethorphan, guaifenesin, guaifenesin/dextromethorphan, ibuprofen, and phenylephrine.   Review of red flags/alarm symptoms:    No severe shortness of breath.    No inability to repeat a sentence without stopping for breath.    No inability to take a full breath lasting 3 to 4 seconds.    No changes in alertness or awareness.    No  "symptoms suggesting airway obstruction.    No symptoms suggesting intracranial hemorrhage.    No decreased urination.   Risk factors for antibiotic resistance:    Smokes tobacco daily.   Self-exam:    Height: 6' 1\"    Weight: 220 lbs    No tonsillar edema.    Tonsils appear normal.    No palatal petechiae.    Neck lymph nodes feel normal.   Recent antibiotic use:    Has not taken antibiotics for similar symptoms within the past month.   Current medications   Not taking other medications or supplements.   Medication allergies   None.   Medication contraindication review   No history of anaphylactic reaction to beta-lactam antibiotics; aspirin triad; blood dyscrasia; bone marrow depression; catecholamine-releasing paraganglioma; coronary artery disease; coagulation disorder; congenital long QT syndrome; depression; electrolyte abnormalities; fungal infection; GI bleeding; GI obstruction; G6PD deficiency; heart arrhythmia; hypertension; mononucleosis; myasthenia; recent myocardial infarction; NSAID-induced asthma/urticaria; Parkinson's disease; pheochromocytoma; porphyria; Reye syndrome; seizure disorder; ulcerative colitis; and urinary retention.   No history of metoclopramide-associated dystonic reaction and tardive dyskinesia.   No known history of amoxicillin-clavulanate-associated cholestatic jaundice or hepatic impairment.   No known history of azithromycin-associated cholestatic jaundice or hepatic impairment.   Past medical history   Immune conditions:   No history of cancer.   Social history   Smokes tobacco daily.   Patient-submitted comments   Patient was asked if they had anything to add about their symptoms. Patient writes: I cough unless im drinking loquids my chest tightens before i cought and that causes the harder deeper cough in morning i cough till gag or puke up phlegm headache 2 days aspirin dont help. Aways cold ivevtried most otc meds nothing helps .   Patient requests a 14-day excuse note.   " Assessment   Bacterial sinusitis.   This is the likely diagnosis based on patient's interview responses, including:    Symptom profile    Duration of symptoms longer than 5 days    Symptoms have not improved   Plan   Medications:    amoxicillin 875 mg-potassium clavulanate 125 mg tablet RX 875mg/125mg 1 tab PO bid 7d for infection. This medication is an antibiotic. Take it exactly as directed. You must finish the entire course of medication, even if you feel better after the first few days of treatment. Amount is 14 tab.    ibuprofen 800 mg tablet RX 800mg 1 tab PO q8h PRN 10d for any fever, pain, or discomfort associated with your condition. Do not exceed 3200mg (4 tablets) in a 24-hour period. Amount is 30 tab.    albuterol sulfate HFA 90 mcg/actuation aerosol inhaler RX 90mcg/puff 2 puffs inhaled q6h PRN 10d for wheezing. If symptoms are severe, may use as often as every 4 hours. Amount is 1 each.    pseudoephedrine 60mg tablet OTC 60mg 1 tab PO q6h PRN 5d for nasal congestion. Do not exceed 240mg (4 pills) in a 24-hour period. Amount is 20 tab.    guaifenesin/dextromethorphan 1200 mg/60 mg tablet OTC 1200mg/60mg 1 tab PO bid PRN 10d for cough. Brands to look for include Mucinex DM Maximum Strength. Amount is 20 tab.    fluticasone 50 mcg/actuation nasal spray,suspension OTC 50mcg 2 sprays each nostril nasal qd 30d for nasal symptoms due to allergies or sinusitis. Fluticasone needs to be used every day to be effective. It may take up to a week for the full effects of the medication to be seen. Brands to look for include Flonase. Amount is 16 g.   The patient's prescriptions will be sent to:   [facility] [id number]   [id number] [id number]   Phone: [phone]     Fax: [phone]   Patient informed to purchase OTC medications.   Other:   Patient was given an excuse note for 3 days.   Education:    Condition and causes    Prevention    Treatment and self-care    When to call provider    ----------   Electronically signed by LUIS Fraser FNP-BC on 2023-11-20 at 19:34PM   ----------   Patient Interview Transcript:   Which of these symptoms are bothering you? Select all that apply.    Cough    Shortness of breath    Stuffed-up nose or sinuses    Runny nose    Sore throat    Headache    Chills    Diarrhea   Not selected:    Itchy or watery eyes    Itchy nose or sneezing    Loss of smell or taste    Hoarse voice or loss of voice    Fever    Sweats    Muscle or body aches    Fatigue or tiredness    Nausea or vomiting    I don't have any of these symptoms   When did your current symptoms start? Select one.    6 to 9 days ago   Not selected:    Less than 48 hours ago    3 to 5 days ago    10 to 14 days ago    2 to 3 weeks ago    3 to 4 weeks ago    More than a month ago   Do you know the exact date your symptoms started? If so, enter the date as MM/DD/YY. Select one.    Yes (specify): 11/12/23   Not selected:    No   Did your symptoms come on suddenly or gradually? Select one.    Suddenly   Not selected:    Gradually    I'm not sure   Have your symptoms improved at all since they began? Select one.    No   Not selected:    Yes, but they haven't gone away completely    Yes, but then they came back worse than before   Since your current symptoms started, have you had a viral test for COVID-19? - This includes home self-tests as well as nose swab or saliva tests done at a doctor's office, lab, or testing site. - It does NOT include antibody tests, which use a blood sample to test for past infection. Select one.    Yes   Not selected:    No   When was your most recent COVID-19 test? Select one.    Within the last 24 hours   Not selected:    Within the last week (specify date as MM/DD/YY)    7 to 14 days ago    15 to 30 days ago    More than 1 month ago   What type of COVID-19 test did you most recently have? Select one.    Viral self-test or home test   Not selected:    Viral test at a doctor's office,  lab, or testing site   What was the result of your most recent COVID-19 test? Select one.    Negative   Not selected:    Positive   Has anyone in your household tested positive for COVID-19 in the past 14 days? Select one.    No   Not selected:    Yes   Have you gotten the COVID-19 vaccine? Select one.    Yes   Not selected:    No   How many total doses of the COVID-19 vaccine have you gotten? This includes boosters as well as additional doses for those who are immunocompromised. Select one.    2 doses   Not selected:    1 dose    3 doses    4 doses    5 doses    6 doses   1st dose    Pfizer   Not selected:    J&J/Xenia    Moderna    Novavax   2nd dose    Pfizer   Not selected:    J&J/Xenia    Moderna    Novavax   When did you get your most recent dose of the COVID-19 vaccine?    More than 14 days ago   Not selected:    Less than 48 hours (2 days) ago    48 to 72 hours (3 days) ago    3 to 5 days ago    5 to 7 days ago    7 to 14 days ago   In the last 14 days, have you traveled outside of your local community? This includes travel by car, RV, bus, train, or plane. Travel increases your chances of getting and spreading COVID-19. Select one.    No   Not selected:    Yes   How would you describe your shortness of breath? Sometimes shortness of breath can be a sign of a more serious condition. Select one.    Moderate (it's bad, but I can still do simple things like get dressed, bathe, or comb my hair)   Not selected:    Mild (about what I normally have with a cold)    Severe (it's so bad that I can't do simple things like get dressed, bathe, or comb my hair)   Do you have a home pulse oximeter? A pulse oximeter measures the oxygen level of your blood. It's usually a clip-like device that attaches to your finger or your ear. Most pulse oximeters can also measure your heart rate. Select one.    Yes   Not selected:    No   Use your pulse oximeter now. Are either of these true? Select all that apply.    Neither of  "these   Not selected:    My oxygen saturation is 94% or less    My heart rate is 120 beats per minute (BPM) or higher   What was the exact reading on your pulse oximeter? Include both the oxygen saturation followed by a percent sign (%), and your heart rate in beats per minute (BPM).    96/100   We'd like to find out more about your shortness of breath. Try to say the following sentence out loud: \"I went to the store today to buy bread, milk, and eggs.\" Are you able to get to the end of the sentence without stopping for breath? If not, you may need emergency care.    Yes   Not selected:    No   Are you able to take a full, deep breath? A full, deep breath means inhaling for 3 to 4 seconds. If you can't do this, you may need to seek emergency care. Select one.    Yes   Not selected:    No   You mentioned having a headache. On a scale of 1 to 10, how severe is your headache pain? Select one.    Severe (7 to 9)   Not selected:    Mild (1 to 3)    Moderate (4 to 6)    Unbearable (10)    The worst headache of my life (10+)   Do you cough so hard that it's made you gag or vomit? By gag, we mean has your coughing made you choke or dry heave? Select all that apply.    Yes, my coughing has made me gag    Yes, my coughing has made me vomit   Not selected:    No   When is your cough the worst? Select all that apply.    In the morning, or when I wake up   Not selected:    During the day    At nighttime, or while I'm sleeping    I haven't noticed a difference depending on time of day   Are you coughing up mucus or phlegm? Select one.    Yes, a little   Not selected:    No, my cough is dry    Yes, a lot   What color is most of the mucus or phlegm that you're coughing up? Select one.    Yellow or yellowish   Not selected:    Clear    White/frothy    Green or greenish    Red or pink    I'm not sure   Do you feel sinus pain or pressure in any of these areas?    In my forehead    Around my eyes    Behind my nose   Not selected:    In " "my cheeks    In my upper teeth or jaw    No   When did you first notice your sinus pain or pressure? Select one.    5 to 9 days ago   Not selected:    Less than 5 days ago    10 to 14 days ago    2 to 4 weeks ago    1 month ago or longer   Does coughing, sneezing, or leaning forward make your sinuses feel worse? Select one.    Yes   Not selected:    No   What color is your nasal drainage? Select one.    Yellow or yellowish   Not selected:    Clear    White    Green or greenish    My nose is stuffed but not draining or running   Is your nasal drainage thick or thin? Select one.    Thick   Not selected:    Thin   Is there any drainage (mucus) going down the back of your throat? This kind of drainage is also called \"postnasal drip.\" It can cause frequent throat clearing. Select one.    No, not that I know of   Not selected:    Yes   Can you swallow liquids and solid foods? A sore throat may be painful when swallowing, but it shouldn't prevent you from swallowing. Select one.    Yes, with ease   Not selected:    Yes, but it's uncomfortable    Yes, but it's painful    It's hard to swallow anything because it feels like liquids and food get stuck in my throat    No, I can't swallow anything, liquid or solid foods   Is your throat pain worse on one side than the other? Select one.    No, it's the same on both sides   Not selected:    Yes, it's worse on the right side    Yes, it's worse on the left side   Since your symptoms started, have you felt dizzy? Select one.    Yes, but I can continue with my regular daily activities   Not selected:    Yes, and it makes it hard to stand, walk, or do daily activities    No   Do you have chest pain? You might also feel it as discomfort, aching, tightness, or squeezing in the chest. Select one.    Yes   Not selected:    No   Which of these is true of your chest pain? Select one.    My chest hurts only when I cough   Not selected:    My chest hurts even when I'm not coughing   Have you " urinated at least 3 times in the last 24 hours? Select one.    Yes   Not selected:    No   Changes in alertness or awareness may mean you need emergency care. Since your symptoms started, have you had any of these? Select all that apply.    None of the above   Not selected:    Confusion    Slurred speech    Not knowing where you are or what day it is    Difficulty staying conscious    Fainting or passing out   Do your symptoms include a whistling sound, or wheezing, when you breathe? Select one.    No   Not selected:    Yes   Do you have any of these symptoms in your ear(s)? Select all that apply.    None of the above   Not selected:    Pain    Pressure    Fullness    Crackling or popping    Plugged or blocked sensation   Are your tonsils larger than usual?    No, not that I can tell   Not selected:    Yes    I've had my tonsils removed   Is there any white or yellow pus on your tonsils?    No, not that I can see   Not selected:    Yes   Are there red spots on the roof of your mouth or the back of your throat?    No, not that I can see   Not selected:    Yes   Are your glands/lymph nodes swollen, or does it hurt when you touch them?    No, not that I can tell   Not selected:    Yes   In the past week, has anyone around you (such as at school, work, or home) had a confirmed diagnosis of the flu? A confirmed diagnosis means that a nose swab was done to verify a flu infection. Select all that apply.    No, not that I know of   Not selected:    I live with someone who has the flu    I've been within touching distance of someone who has the flu    I've walked by, or sat about 3 feet away from, someone who has the flu    I've been in the same building as someone who has the flu   Have you ever been diagnosed with asthma? Select one.    No   Not selected:    Yes   Have you ever been prescribed albuterol to use for wheezing, cough, or shortness of breath caused by a cold, bronchitis, or pneumonia? Albuterol (ProAir,  Proventil, Ventolin) is prescribed as an inhaler or a solution to be used with a nebulizer machine. Select one.    Yes   Not selected:    No, not that I know of   Have you ever been prescribed a steroid inhaler to use for wheezing, cough, or shortness of breath caused by a cold, bronchitis, or pneumonia? Some examples of steroid inhalers include Pulmicort, Flovent, Qvar, and Alvesco. Select one.    No, not that I know of   Not selected:    Yes   Have you used albuterol for your current symptoms? This includes both albuterol inhalers and albuterol solution in a nebulizer machine. Select one.    No, I don't have any, or it's    Not selected:    Yes    No, I don't feel like I need it   Do you need a refill of albuterol? Select one.    No   Not selected:    Yes   Have you ever been diagnosed with chronic obstructive pulmonary disease (COPD)? Select one.    No, not that I know of   Not selected:    Yes   In the past month, have you taken antibiotics for similar symptoms? Examples of antibiotics include amoxicillin, amoxicillin-clavulanate (Augmentin), penicillin, cefdinir (Omnicef), doxycycline, and clindamycin (Cleocin). Select one.    No   Not selected:    Yes (specify)   In the last year, how many times were you treated with antibiotics for a sinus infection? Select one.    None   Not selected:    1 to 3 times    4 or more times   Have you been diagnosed with a deviated septum or nasal polyps? The nose is divided into two nostrils by the septum. A crooked septum is called a deviated septum. Nasal polyps are growths inside the nose or sinuses. Select one.    No, not that I know of   Not selected:    Yes, but I had surgery to treat them    Yes, I have a deviated septum    Yes, I have nasal polyps    Yes, I have a deviated septum and nasal polyps   Do you have a sore inside your nose that won't heal? Select one.    No, not that I know of   Not selected:    Yes   Do you have allergies (pollen, dust mites, mold,  animal dander)? Select one.    No, not that I know of   Not selected:    Yes   Have you had a flu shot this season? Select one.    No   Not selected:    Yes, less than 2 weeks ago    Yes, 2 to 4 weeks ago    Yes, 1 to 3 months ago    Yes, 3 to 6 months ago    Yes, more than 6 months ago   The flu and COVID-19 can be more serious for people in certain groups. The next few questions help us figure out if you or anyone you live with is at higher risk for complications from these infections. Do any of these statements apply to you? Select all that apply.    None of the above   Not selected:    I'm     I'm     I'm Black    I'm  or    Do you smoke tobacco? Select one.    Yes, every day   Not selected:    Yes, some days    No, I quit    No   Do you have a mostly inactive lifestyle? Answer yes if all of these are true: - You spend at least 6 hours a day sitting or lying down - You get less than 2 and a half hours per week of moderate exercise such as walking fast - You get less than 1 hour and 15 minutes per week of intense exercise such as jogging or running Select one.    Yes   Not selected:    No   Do you have any of these conditions? Select all that apply.    None of the above   Not selected:    Chronic lung disease, such as cystic fibrosis or interstitial fibrosis    Heart disease, such as congenital heart disease, congestive heart failure, or coronary artery disease    Disorder of the brain, spinal cord, or nerves and muscles, such as dementia, cerebral palsy, epilepsy, muscular dystrophy, or developmental delay    Metabolic disorder or mitochondrial disease    Cerebrovascular disease, such as stroke or another condition affecting the blood vessels or blood supply to the brain    Down syndrome    Mood disorder, including depression or schizophrenia spectrum disorders    Substance use disorder, such as alcohol, opioid, or cocaine use disorder    Tuberculosis    Primary  immunodeficiency   Do you live in a group care setting? Examples include: - Nursing home - Residential care - Psychiatric treatment facility - Group home - Dormitory - Board and care home - Homeless shelter - Foster care setting Select one.    No   Not selected:    Yes   Are you a healthcare worker? Select one.    No   Not selected:    Yes   People with a very high body mass index (BMI) are at higher risk for developing complications from the flu and severe illness from COVID-19. To determine your BMI, we need to know your weight and height. Please enter your weight (in pounds).    Weight   Please enter your height.    Height   Do you have any of these conditions that can affect the immune system? Scroll to see all options. Select all that apply.    None of these   Not selected:    History of bone marrow transplant    Chronic kidney disease    Chronic liver disease (including cirrhosis)    HIV/AIDS    Inflammatory bowel disease (Crohn's disease or ulcerative colitis)    Lupus    Moderate to severe plaque psoriasis    Multiple sclerosis    Rheumatoid arthritis    Sickle cell anemia    Alpha or beta thalassemia    History of solid organ transplant (kidney, liver, or heart)    History of spleen removal    An autoimmune disorder not listed here (specify)    A condition requiring treatment with long-term use of oral steroids (such as prednisone, prednisolone, or dexamethasone) (specify)   Have you ever been diagnosed with cancer? Select one.    No   Not selected:    Yes, I have cancer now    Yes, but I'm in remission   Do any of these apply to you? Select all that apply.    None of the above   Not selected:    I've been hospitalized within the last 5 days    I have diabetes    I'm in close contact with a child in    The flu and COVID-19 can be more serious for people in certain groups. Do any of these apply to the people who live with you? Select all that apply.    None of the above   Not selected:    Under age  5    Over age 65            Black     or     Pregnant    Has given birth, had a miscarriage, had a pregnancy loss, or had an  in the last 2 weeks   Does any member of your household have any of these medical conditions? Select all that apply.    None of the above   Not selected:    Asthma    Disorders of the brain, spinal cord, or nerves and muscles, such as dementia, cerebral palsy, epilepsy, muscular dystrophy, or developmental delay    Chronic lung disease, such as COPD or cystic fibrosis    Heart disease, such as congenital heart disease, congestive heart failure, or coronary artery disease    Cerebrovascular disease, such as stroke or another condition affecting the blood vessels or blood supply to the brain    Blood disorders, such as sickle cell disease    Diabetes    Metabolic disorders such as inherited metabolic disorders or mitochondrial disease    Kidney disorders    Liver disorders    Weakened immune system due to illness or medications such as chemotherapy or steroids    Children under the age of 19 who are on long-term aspirin therapy    Extreme obesity (BMI > 40)   Do you have any of these conditions? Scroll to see all options. Select all that apply.    None of the above   Not selected:    Aspirin triad (also known as Samter's triad or ASA triad)    Asthma or hives from taking aspirin or other NSAIDs, such as ibuprofen or naproxen    Blockage or narrowing of the blood vessels of the heart    Blood clotting disorder    Blood dyscrasia, such anemia, leukemia, lymphoma, or myeloma    Bone marrow depression    Catecholamine-releasing paraganglioma    Congenital long QT syndrome    Depression    Difficulty urinating or completely emptying your bladder    Uncorrected electrolyte abnormalities    Fungal infection    Gastrointestinal (GI) bleeding    Gastrointestinal (GI) obstruction    G6PD deficiency    Recent heart attack    High blood pressure     Irregular heartbeat or heart rhythm    Mononucleosis (mono)    Myasthenia gravis    Parkinson's disease    Pheochromocytoma    Reye syndrome    Seizure disorder    Thyroid disease    Ulcerative colitis   Have you ever had either of these conditions? Select all that apply.    No   Not selected:    Metoclopramide-associated dystonic reaction    Tardive dyskinesia   Just a few more questions about medications, and then you're finished. Have you used any non-prescription medications or nasal sprays for your current symptoms? Examples include saline sprays, decongestants, NyQuil, and Tylenol. Select one.    Yes   Not selected:    No   Which of these non-prescription medications have you tried? Scroll to see all options. Select all that apply.    Acetaminophen (Tylenol)    Dextromethorphan (Delsym, Robitussin, Vicks DayQuil Cough)    Guaifenesin (Mucinex)    Guaifenesin/dextromethorphan (Delsym DM, Mucinex DM, Robitussin DM)    Ibuprofen (Advil, Motrin, Midol)    Phenylephrine (Sudafed PE)   Not selected:    Budesonide (Rhinocort)    Cetirizine (Zyrtec)    Chlorpheniramine (Aller-chlor, Chlor-Trimeton)    Cromolyn (NasalCrom)    Diphenhydramine (Benadryl)    Fexofenadine (Allegra)    Fluticasone (Flonase)    Ketotifen (Alaway, Zaditor)    Loratadine (Alavert, Claritin)    Naphazoline-pheniramine (Naphcon-A, Opcon-A, Visine-A)    Omeprazole (Prilosec)    Oxymetazoline (Afrin)    Triamcinolone (Nasacort)    None of the above   Have you taken any monoamine oxidase inhibitor (MAOI) medications in the last 14 days? Examples include rasagiline (Azilect), selegiline (Eldepryl, Zelapar), isocarboxazid (Marplan), phenelzine (Nardil), and tranylcypromine (Parnate). Select one.    No, not that I know of   Not selected:    Yes   Do you take Kynmobi or Apokyn (apomorphine)? Select one.    No   Not selected:    Yes   Are you taking any other medications, vitamins, or supplements? Select one.    No   Not selected:    Yes   Have you  ever had an allergic or bad reaction to any medication? Select one.    No   Not selected:    Yes   Are you allergic to milk or to the proteins found in milk (for example, whey or casein)? A milk allergy is different from lactose intolerance. Select one.    No, not that I know of   Not selected:    Yes   Have you ever had jaundice or liver problems as a result of taking amoxicillin-clavulanate (Augmentin)? Jaundice is a condition in which the skin and the whites of the eyes turn yellow. Select all that apply.    No, not that I know of   Not selected:    Yes, jaundice    Yes, liver problems   Have you ever had jaundice or liver problems as a result of taking azithromycin (Zithromax, Zmax)? Jaundice is a condition in which the skin and the whites of the eyes turn yellow. Select all that apply.    No, not that I know of   Not selected:    Yes, jaundice    Yes, liver problems   Do you need a doctor's note? A doctor's note confirms that you received care today and states when you can return to school or work. It does not contain information about your diagnosis or treatment plan. Your provider will make the final decision on whether to give you a doctor's note and for how long. Doctor's notes CANNOT be backdated. We can't provide medical leave paperwork through this type of visit. If more paperwork is needed to request time off, contact your primary care provider. Select one.    14 days   Not selected:    Today only (1 day)    Today and tomorrow (2 days)    3 days    5 days    7 days    10 days    No   Is there anything you'd like to add about your symptoms? Please limit your comments to the symptoms asked about in this interview. If you include comments about other concerns, your provider may recommend that you be seen in person.    I cough unless im drinking loquids my chest tightens before i cought and that causes the harder deeper cough in morning i cough till gag or puke up phlegm headache 2 days aspirin dont help.  Aways cold ivevtried most otc meds nothing helps   ----------   Medical history   Medical history data does not currently exist for this patient.

## 2023-11-21 NOTE — EXTERNAL PATIENT INSTRUCTIONS
View Doctor's Note     Note   I have prescribed Augmentin, Flonase, Albuterol inhaler, Sudafed and guaifenesin/dextromethorphan gouch syrup. If your symptoms do not improve or become worse, follow up with your doctor. If you need an extension on your excuse, follow up with your doctor.   Diagnosis   Bacterial sinusitis   My name is LUIS Fraser FNP-BC, and I'm a healthcare provider at [facility]. I reviewed your interview, and I see that you have bacterial sinusitis, also known as a bacterial sinus infection.   I've given you a doctor's note for 3 days.   Medications   Your pharmacy   [facility] [id number] [facility] [id number] [phone]     Prescription   Amoxicillin-clavulanate (875mg/125mg): Take 1 tablet by mouth twice a day for 7 days for infection. This medication is an antibiotic. Take it exactly as directed. You must finish the entire course of medication, even if you feel better after the first few days of treatment.   Ibuprofen (800mg): Take 1 tablet by mouth every 8 hours as needed for up to 10 days for any fever, pain, or discomfort associated with your condition. Do not exceed 3200mg (4 tablets) in a 24-hour period.   Albuterol sulfate HFA (90mcg/puff): Inhale 2 puffs every 6 hours as needed for wheezing. If symptoms are severe, may use as often as every 4 hours.    Start taking the antibiotics I've prescribed right away. You need to finish the entire course of antibiotics, even if you start to feel better before the pills run out.    I've given you a prescription dose of ibuprofen. If it's more affordable or convenient, you may use the equivalent amount of non-prescription ibuprofen. For example, instead of taking one 800 mg ibuprofen tablet, you may take four 200 mg ibuprofen tablets.   Non-prescription   Pseudoephedrine (60mg): Take 1 tablet by mouth every 6 hours as needed for nasal congestion for up to 5 days. Do not exceed 240mg (4 pills) in a 24-hour period.    Guaifenesin/dextromethorphan (1200mg/60mg): Take 1 tablet by mouth twice a day as needed for cough. Brands to look for include Mucinex DM Maximum Strength.   Fluticasone (50mcg): Spray 2 times in each nostril daily for nasal symptoms due to allergies or sinusitis. Fluticasone needs to be used every day to be effective. It may take up to a week for the full effects of the medication to be seen. Brands to look for include Flonase.    I've recommended a decongestant (pseudoephedrine) for your congestion. Although this is an over-the-counter medication, it's kept behind the pharmacist's counter. Ask the pharmacist for pseudoephedrine for your congestion.   About your diagnosis   The sinuses are hollow spaces connected to the nasal passages. Sinusitis occurs when the sinuses swell and block the drainage of fluid and mucus from the nose, causing pain, pressure, and congestion. Fatigue, difficulty sleeping, or decreased appetite may accompany your symptoms.   More than 90% of sinus infections are caused by viruses. However, in certain cases, a sinus infection may be caused by bacteria. Bacterial sinus infections usually look like one of the following cases:    Severe sinus symptoms with a fever over 102F.    Sinus symptoms that have not improved at all after 10 days.    Cold symptoms that slowly improve but then worsen again after 5 or 6 days, usually with a high fever, headache, or nasal discharge.   What to expect   If you follow this treatment plan, you should start to feel better within a few days.   When to seek care   Call us at [phone]   with any sudden or unexpected symptoms.    Symptoms that last longer than 10 to 14 days.    Symptoms that get better for a few days, and then suddenly get worse.    Fever that measures over 103F or continues for more than 3 days.    Any vision changes.    A worsening headache.    Stiff neck.    Swelling of your forehead or eyes.    Coughing up red or bloody mucus.     Swallowing becomes extremely difficult or impossible.    More than 5 episodes of diarrhea in a day.    More than 5 episodes of vomiting in a day.    Severe shortness of breath.   You mentioned having chest pain. If you develop any of the following, call 911 or go immediately to your nearest emergency room:    A feeling of pressure on your chest    Pain that gets worse with physical activity    Pain that feels like it is radiating to the shoulder, neck, arm, or jaw   Other treatment    Rest! Your body needs rest to recover and fight infection.    Drink plenty of water to stay hydrated.    Use steam to soothe your sinuses: Breathe it in from a shower or a bowl of hot water. Placing a warm, moist washcloth over your nose and forehead may help relieve the sinus pain and pressure.    Try non-prescription saline nasal sprays to help your nasal symptoms. Try using a Neti Pot to flush out your stuffy nose and sinuses. Neti Pots are available at any drugstore without a prescription.    Avoid smoke and air pollution. Smoke can make infections worse. I encourage you to quit smoking, even for a week or two. Quitting will help your symptoms improve faster.   Prevention    Avoid close contact with other people when you're sick.    Cover your mouth and nose when you cough or sneeze. Use a tissue or cough into your elbow. Make sure that used tissues go directly into the trash.    Avoid touching your eyes, nose, or mouth while you're sick.    Wash your hands often, especially after coughing, sneezing, or blowing your nose. If soap and water are not available, use an alcohol-based hand .    If you or someone in your home or workplace is sick, disinfect commonly used items. This includes door handles, tables, computers, remotes, and pens.    Coronavirus (COVID-19) information   Common symptoms of COVID-19 include fever, cough, shortness of breath, fatigue, muscle or body aches, headaches, new loss of sense of taste or smell,  sore throat, stuffy or runny nose, nausea or vomiting, and diarrhea. Most people who get COVID-19 have mild symptoms and can rest at home until they get better. Elderly people and those with chronic medical problems may be at risk for more serious complications.   FAQs about the COVID-19 vaccine   There are four authorized COVID-19 vaccines: Mello & Mello's Xenia Vaccine (J&J/Xenia), Moderna, Novavax, and Pfizer-BioNTech (Pfizer). The J&J/Xenia and Novavax vaccines are approved for use in people aged 18 and older. The Moderna and Pfizer vaccines are approved for those aged 6 months and older. All four are available at no cost. Even if you don't have health insurance, you can still get the COVID-19 vaccine for free.   Which vaccine is the best? Which vaccine should I get?   All four vaccines are highly effective. Even if you get COVID-19 after being vaccinated, all of the vaccines help prevent severe disease, hospitalization, and complications.   Most people should get whichever vaccine is first available to them. However, women younger than 50 years old should consider the rare risk of blood clots with low platelets after vaccination with the J&J/Xenia vaccine. This risk hasn't been seen with the other three vaccines.   Are the vaccines safe?   Yes. Hundreds of millions of people in the [place] have already safely received COVID-19 vaccines under the most intense safety monitoring in the history of the [place].   Do I need the vaccine if I've already had COVID?   Yes. Vaccination helps protect you even if you've already had COVID.   If you had COVID-19 and had symptoms, wait to get vaccinated until you've recovered and completed your isolation period.   If you tested positive for COVID-19 but did not have symptoms, you can get vaccinated after 5 full days have passed since you had a positive test, as long as you don't develop symptoms.   How many doses of the vaccine do I need?   Visit  www.cdc.gov/coronavirus/2019-ncov/vaccines/stay-up-to-date.html   to find out how to stay up to date with your COVID-19 vaccines.   I'm immunocompromised. How many doses of the vaccine do I need?   For information on how immunocompromised people can stay up to date with their COVID-19 vaccines, visit www.cdc.gov/coronavirus/2019-ncov/vaccines/recommendations/immuno.html  .   What are the common side effects of the vaccine?   A sore arm, tiredness, headache, and muscle pain may occur within two days of getting the vaccine and last a day or two. For the Moderna or Pfizer vaccines, side effects are more common after the second dose. People over the age of 55 are less likely to have side effects than younger people.   After I'm up to date on vaccines, can I still get or spread COVID?   Yes, you can still get COVID, but your disease should be milder. And your risk of serious illness, hospitalization, and complications will be much lower, especially if you're up to date. Unfortunately, you can still spread COVID if you've been vaccinated. That's why it's important to follow isolation guidelines if you get sick or test positive.   After I'm up to date on vaccines, can I go back to normal?   You should still wear a mask indoors in public if:    It's required by laws, rules, regulations, or local guidance.    You have a weakened immune system.    Your age puts you at increased risk of severe disease.    You have a medical condition that puts you at increased risk of severe disease.    Someone in your household has a weakened immune system, is at increased risk for severe disease, or is unvaccinated.    You're in an area of high transmission.   Where can I get a COVID-19 vaccine?   Visit [facility]'s website for more information. To find a COVID-19 vaccination site near you, visit www.vaccines.gov/  , call [phone]  , or text your zip code to 534159 ([employer]). Message and data rates may apply.   What about travel?    Travel increases your risk of exposure to COVID-19. For more information, see www.cdc.gov/coronavirus/2019-ncov/travelers/index.html  .   I've had close contact with someone who has COVID. Do I need to quarantine, and if so, for how long?   For the most current answer, including a calculator to determine whether you need to stay home and for how long, visit www.cdc.gov/coronavirus/2019-ncov/your-health/quarantine-isolation.html  .   I've tested positive for COVID. How long do I need to isolate?   For the latest recommendations, including a calculator to determine how long you need to stay home, visit www.cdc.gov/coronavirus/2019-ncov/your-health/quarantine-isolation.html  .   What if I develop symptoms that might be from COVID?   For the latest recommendations on what to do if you're sick, including when to seek emergency care, visit www.cdc.gov/coronavirus/2019-ncov/if-you-are-sick/steps-when-sick.html  .    Flu vaccine information   Who should get a flu vaccine?   Everyone 6 months of age and older should get a yearly flu vaccine.   When should I get vaccinated?   You should get a flu vaccine by the end of October. Once you're vaccinated, it takes about two weeks for antibodies to develop and protect you against the flu. That's why it's important to get vaccinated as soon as possible.   After October, is it too late to get vaccinated?   No. You should still get vaccinated. As long as the flu viruses are still in your community, flu vaccines will remain available, even into January of next year or later.   Why do I need a flu vaccine EVERY year?   Flu viruses are constantly changing, so flu vaccines are usually updated from one season to the next. Your protection from the flu vaccine also lessens over time.   Is the flu vaccine safe?   Yes. Over the last 50 years, hundreds of millions of Americans have safely received the flu vaccines.   What are the side effects of flu vaccines?   You CANNOT get the flu from a flu  vaccine. Common side effects of the flu shot include soreness, redness and/or swelling where the shot was given, low grade fever, and aches. Common side effects of the nasal spray flu vaccine for adults include runny nose, headaches, sore throat, and cough. For children, side effects include wheezing, vomiting, muscle aches, and fever.   Does the flu vaccine increase your risk of getting COVID-19?   No. There is no evidence that getting a flu vaccine increases your risk of getting COVID-19.   Is it safe to get the flu vaccine along with a COVID-19 vaccine?   Yes. It's safe to get the flu vaccine with a COVID-19 vaccine or booster.   Contact your healthcare provider TODAY for details on when and where to get your flu vaccine.   Your provider   Your diagnosis was provided by LUIS Fraser FNP-BC, a member of your trusted care team at [facility].   If you have any questions, call us at [phone]  .   View Doctor's Note     Expires on 12/20/23

## 2024-08-07 ENCOUNTER — OFFICE VISIT (OUTPATIENT)
Dept: FAMILY MEDICINE CLINIC | Facility: CLINIC | Age: 49
End: 2024-08-07
Payer: COMMERCIAL

## 2024-08-07 ENCOUNTER — PATIENT ROUNDING (BHMG ONLY) (OUTPATIENT)
Dept: FAMILY MEDICINE CLINIC | Facility: CLINIC | Age: 49
End: 2024-08-07

## 2024-08-07 VITALS
HEIGHT: 73 IN | WEIGHT: 218.2 LBS | BODY MASS INDEX: 28.92 KG/M2 | SYSTOLIC BLOOD PRESSURE: 126 MMHG | DIASTOLIC BLOOD PRESSURE: 74 MMHG | HEART RATE: 99 BPM | OXYGEN SATURATION: 97 %

## 2024-08-07 DIAGNOSIS — L30.9 ECZEMA, UNSPECIFIED TYPE: ICD-10-CM

## 2024-08-07 DIAGNOSIS — E66.3 OVERWEIGHT (BMI 25.0-29.9): Primary | ICD-10-CM

## 2024-08-07 DIAGNOSIS — F41.1 GENERALIZED ANXIETY DISORDER WITH PANIC ATTACKS: ICD-10-CM

## 2024-08-07 DIAGNOSIS — Z23 NEED FOR TDAP VACCINATION: ICD-10-CM

## 2024-08-07 DIAGNOSIS — F17.200 CURRENT SMOKER: ICD-10-CM

## 2024-08-07 DIAGNOSIS — E66.3 OVERWEIGHT (BMI 25.0-29.9): ICD-10-CM

## 2024-08-07 DIAGNOSIS — Z76.89 ENCOUNTER TO ESTABLISH CARE: Primary | ICD-10-CM

## 2024-08-07 DIAGNOSIS — F41.0 GENERALIZED ANXIETY DISORDER WITH PANIC ATTACKS: ICD-10-CM

## 2024-08-07 DIAGNOSIS — Z23 NEED FOR PNEUMOCOCCAL 20-VALENT CONJUGATE VACCINATION: ICD-10-CM

## 2024-08-07 PROBLEM — F51.01 PRIMARY INSOMNIA: Status: ACTIVE | Noted: 2024-08-07

## 2024-08-07 PROCEDURE — 90677 PCV20 VACCINE IM: CPT

## 2024-08-07 PROCEDURE — 99214 OFFICE O/P EST MOD 30 MIN: CPT

## 2024-08-07 PROCEDURE — 90715 TDAP VACCINE 7 YRS/> IM: CPT

## 2024-08-07 PROCEDURE — 90471 IMMUNIZATION ADMIN: CPT

## 2024-08-07 RX ORDER — VARENICLINE TARTRATE 1 MG/1
1 TABLET, FILM COATED ORAL 2 TIMES DAILY
Qty: 56 TABLET | Refills: 1 | Status: SHIPPED | OUTPATIENT
Start: 2024-09-04 | End: 2024-10-30

## 2024-08-07 RX ORDER — TRIAMCINOLONE ACETONIDE 1 MG/G
1 OINTMENT TOPICAL 2 TIMES DAILY
Qty: 30 G | Refills: 0 | Status: SHIPPED | OUTPATIENT
Start: 2024-08-07

## 2024-08-07 RX ORDER — VARENICLINE TARTRATE 0.5 (11)-1
KIT ORAL
Qty: 1 EACH | Refills: 0 | Status: SHIPPED | OUTPATIENT
Start: 2024-08-07 | End: 2024-09-04

## 2024-08-07 RX ORDER — CITALOPRAM HYDROBROMIDE 10 MG/1
TABLET ORAL
Qty: 94 TABLET | Refills: 0 | Status: SHIPPED | OUTPATIENT
Start: 2024-08-07 | End: 2024-11-12

## 2024-08-07 RX ORDER — PHENTERMINE HYDROCHLORIDE 37.5 MG/1
37.5 CAPSULE ORAL EVERY MORNING
Qty: 90 CAPSULE | Refills: 0 | Status: CANCELLED | OUTPATIENT
Start: 2024-08-07

## 2024-08-07 RX ORDER — TOPIRAMATE 50 MG/1
TABLET, FILM COATED ORAL
Qty: 166 TABLET | Refills: 0 | Status: CANCELLED | OUTPATIENT
Start: 2024-08-07 | End: 2024-11-05

## 2024-08-07 NOTE — PATIENT INSTRUCTIONS
"Healthy Eating, Adult  Healthy eating may help you get and keep a healthy body weight, reduce the risk of chronic disease, and live a long and productive life. It is important to follow a healthy eating pattern. Your nutritional and calorie needs should be met mainly by different nutrient-rich foods.  What are tips for following this plan?  Reading food labels  Read labels and choose the following:  Reduced or low sodium products.  Juices with 100% fruit juice.  Foods with low saturated fats (<3 g per serving) and high polyunsaturated and monounsaturated fats.  Foods with whole grains, such as whole wheat, cracked wheat, brown rice, and wild rice.  Whole grains that are fortified with folic acid. This is recommended for females who are pregnant or who want to become pregnant.  Read labels and do not eat or drink the following:  Foods or drinks with added sugars. These include foods that contain brown sugar, corn sweetener, corn syrup, dextrose, fructose, glucose, high-fructose corn syrup, honey, invert sugar, lactose, malt syrup, maltose, molasses, raw sugar, sucrose, trehalose, or turbinado sugar.  Limit your intake of added sugars to less than 10% of your total daily calories. Do not eat more than the following amounts of added sugar per day:  6 teaspoons (25 g) for females.  9 teaspoons (38 g) for males.  Foods that contain processed or refined starches and grains.  Refined grain products, such as white flour, degermed cornmeal, white bread, and white rice.  Shopping  Choose nutrient-rich snacks, such as vegetables, whole fruits, and nuts. Avoid high-calorie and high-sugar snacks, such as potato chips, fruit snacks, and candy.  Use oil-based dressings and spreads on foods instead of solid fats such as butter, margarine, sour cream, or cream cheese.  Limit pre-made sauces, mixes, and \"instant\" products such as flavored rice, instant noodles, and ready-made pasta.  Try more plant-protein sources, such as tofu, " tempeh, black beans, edamame, lentils, nuts, and seeds.  Explore eating plans such as the Mediterranean diet or vegetarian diet.  Try heart-healthy dips made with beans and healthy fats like hummus and guacamole. Vegetables go great with these.  Cooking  Use oil to sauté or stir-carrington foods instead of solid fats such as butter, margarine, or lard.  Try baking, boiling, grilling, or broiling instead of frying.  Remove the fatty part of meats before cooking.  Steam vegetables in water or broth.  Meal planning    At meals, imagine dividing your plate into fourths:  One-half of your plate is fruits and vegetables.  One-fourth of your plate is whole grains.  One-fourth of your plate is protein, especially lean meats, poultry, eggs, tofu, beans, or nuts.  Include low-fat dairy as part of your daily diet.  Lifestyle  Choose healthy options in all settings, including home, work, school, restaurants, or stores.  Prepare your food safely:  Wash your hands after handling raw meats.  Where you prepare food, keep surfaces clean by regularly washing with hot, soapy water.  Keep raw meats separate from ready-to-eat foods, such as fruits and vegetables.  , meat, poultry, and eggs to the recommended temperature. Get a food thermometer.  Store foods at safe temperatures. In general:  Keep cold foods at 40°F (4.4°C) or below.  Keep hot foods at 140°F (60°C) or above.  Keep your freezer at 0°F (-17.8°C) or below.  Foods are not safe to eat if they have been between the temperatures of °F (4.4-60°C) for more than 2 hours.  What foods should I eat?  Fruits  Aim to eat 1½-2½ cups of fresh, canned (in natural juice), or frozen fruits each day. One cup of fruit equals 1 small apple, 1 large banana, 8 large strawberries, 1 cup (237 g) canned fruit, ½ cup (82 g) dried fruit, or 1 cup (240 mL) 100% juice.  Vegetables  Aim to eat 2-4 cups of fresh and frozen vegetables each day, including different varieties and colors. One cup  of vegetables equals 1 cup (91 g) broccoli or cauliflower florets, 2 medium carrots, 2 cups (150 g) raw, leafy greens, 1 large tomato, 1 large kirby pepper, 1 large sweet potato, or 1 medium white potato.  Grains  Aim to eat 5-10 ounce-equivalents of whole grains each day. Examples of 1 ounce-equivalent of grains include 1 slice of bread, 1 cup (40 g) ready-to-eat cereal, 3 cups (24 g) popcorn, or ½ cup (93 g) cooked rice.  Meats and other proteins  Try to eat 5-7 ounce-equivalents of protein each day. Examples of 1 ounce-equivalent of protein include 1 egg, ½ oz nuts (12 almonds, 24 pistachios, or 7 walnut halves), 1/4 cup (90 g) cooked beans, 6 tablespoons (90 g) hummus or 1 tablespoon (16 g) peanut butter. A cut of meat or fish that is the size of a deck of cards is about 3-4 ounce-equivalents (85 g).  Of the protein you eat each week, try to have at least 8 sounce (227 g) of seafood. This is about 2 servings per week. This includes salmon, trout, herring, sardines, and anchovies.  Dairy  Aim to eat 3 cup-equivalents of fat-free or low-fat dairy each day. Examples of 1 cup-equivalent of dairy include 1 cup (240 mL) milk, 8 ounces (250 g) yogurt, 1½ ounces (44 g) natural cheese, or 1 cup (240 mL) fortified soy milk.  Fats and oils  Aim for about 5 teaspoons (21 g) of fats and oils per day. Choose monounsaturated fats, such as canola and olive oils, mayonnaise made with olive oil or avocado oil, avocados, peanut butter, and most nuts, or polyunsaturated fats, such as sunflower, corn, and soybean oils, walnuts, pine nuts, sesame seeds, sunflower seeds, and flaxseed.  Beverages  Aim for 6 eight-ounce glasses of water per day. Limit coffee to 3-5 eight-ounce cups per day.  Limit caffeinated beverages that have added calories, such as soda and energy drinks.  If you drink alcohol:  Limit how much you have to:  0-1 drink a day if you are female.  0-2 drinks a day if you are male.  Know how much alcohol is in your drink.  In the [place] one drink is one 12 oz bottle of beer (355 mL), one 5 oz glass of wine (148 mL), or one 1½ oz glass of hard liquor (44 mL).  Seasoning and other foods  Try not to add too much salt to your food. Try using herbs and spices instead of salt.  Try not to add sugar to food.  This information is based on [place] nutrition guidelines. To learn more, visit Duolingo.gov. Exact amounts may vary. You may need different amounts.  This information is not intended to replace advice given to you by your health care provider. Make sure you discuss any questions you have with your health care provider.  Document Revised: 09/18/2023 Document Reviewed: 09/18/2023  [employer] Patient Education © 2023 [employer]    Exercising to Stay Healthy  To become healthy and stay healthy, it is recommended that you do moderate-intensity and vigorous-intensity exercise. You can tell that you are exercising at a moderate intensity if your heart starts beating faster and you start breathing faster but can still hold a conversation. You can tell that you are exercising at a vigorous intensity if you are breathing much harder and faster and cannot hold a conversation while exercising.  How can exercise benefit me?  Exercising regularly is important. It has many health benefits, such as:  Improving overall fitness, flexibility, and endurance.  Increasing bone density.  Helping with weight control.  Decreasing body fat.  Increasing muscle strength and endurance.  Reducing stress and tension, anxiety, depression, or anger.  Improving overall health.  What guidelines should I follow while exercising?  Before you start a new exercise program, talk with your health care provider.  Do not exercise so much that you hurt yourself, feel dizzy, or get very short of breath.  Wear comfortable clothes and wear shoes with good support.  Drink plenty of water while you exercise to prevent dehydration or heat stroke.  Work out until your breathing and  your heartbeat get faster (moderate intensity).  How often should I exercise?  Choose an activity that you enjoy, and set realistic goals. Your health care provider can help you make an activity plan that is individually designed and works best for you.  Exercise regularly as told by your health care provider. This may include:  Doing strength training two times a week, such as:  Lifting weights.  Using resistance bands.  Push-ups.  Sit-ups.  Yoga.  Doing a certain intensity of exercise for a given amount of time. Choose from these options:  A total of 150 minutes of moderate-intensity exercise every week.  A total of 75 minutes of vigorous-intensity exercise every week.  A mix of moderate-intensity and vigorous-intensity exercise every week.  Children, pregnant women, people who have not exercised regularly, people who are overweight, and older adults may need to talk with a health care provider about what activities are safe to perform. If you have a medical condition, be sure to talk with your health care provider before you start a new exercise program.  What are some exercise ideas?  Moderate-intensity exercise ideas include:  Walking 1 mile (1.6 km) in about 15 minutes.  Biking.  Hiking.  Golfing.  Dancing.  Water aerobics.  Vigorous-intensity exercise ideas include:  Walking 4.5 miles (7.2 km) or more in about 1 hour.  Jogging or running 5 miles (8 km) in about 1 hour.  Biking 10 miles (16.1 km) or more in about 1 hour.  Lap swimming.  Roller-skating or in-line skating.  Cross-country skiing.  Vigorous competitive sports, such as football, basketball, and soccer.  Jumping rope.  Aerobic dancing.  What are some everyday activities that can help me get exercise?  Yard work, such as:  Pushing a .  Raking and bagging leaves.  Washing your car.  Pushing a stroller.  Shoveling snow.  Gardening.  Washing windows or floors.  How can I be more active in my day-to-day activities?  Use stairs instead of an  elevator.  Take a walk during your lunch break.  If you drive, park your car farther away from your work or school.  If you take public transportation, get off one stop early and walk the rest of the way.  Stand up or walk around during all of your indoor phone calls.  Get up, stretch, and walk around every 30 minutes throughout the day.  Enjoy exercise with a friend. Support to continue exercising will help you keep a regular routine of activity.  Where to find more information  You can find more information about exercising to stay healthy from:  [place] Department of Health and Human Services: www.hhs.gov  Centers for Disease Control and Prevention (CDC): www.cdc.gov  Summary  Exercising regularly is important. It will improve your overall fitness, flexibility, and endurance.  Regular exercise will also improve your overall health. It can help you control your weight, reduce stress, and improve your bone density.  Do not exercise so much that you hurt yourself, feel dizzy, or get very short of breath.  Before you start a new exercise program, talk with your health care provider.  This information is not intended to replace advice given to you by your health care provider. Make sure you discuss any questions you have with your health care provider.  Document Revised: 04/15/2022 Document Reviewed: 04/15/2022  [employer] Patient Education © 2023 [employer] Steps to Quit Smoking  Smoking tobacco is the leading cause of preventable death. It can affect almost every organ in the body. Smoking puts you and people around you at risk for many serious, long-lasting (chronic) diseases. Quitting smoking can be hard, but it is one of the best things that you can do for your health. It is never too late to quit.  Do not give up if you cannot quit the first time. Some people need to try many times to quit. Do your best to stick to your quit plan, and talk with your doctor if you have any questions or concerns.  How do I get ready  to quit?  Pick a date to quit. Set a date within the next 2 weeks to give you time to prepare.  Write down the reasons why you are quitting. Keep this list in places where you will see it often.  Tell your family, friends, and co-workers that you are quitting. Their support is important.  Talk with your doctor about the choices that may help you quit.  Find out if your health insurance will pay for these treatments.  Know the people, places, things, and activities that make you want to smoke (triggers). Avoid them.  What first steps can I take to quit smoking?  Throw away all cigarettes at home, at work, and in your car.  Throw away the things that you use when you smoke, such as ashtrays and lighters.  Clean your car. Empty the ashtray.  Clean your home, including curtains and carpets.  What can I do to help me quit smoking?  Talk with your doctor about taking medicines and seeing a counselor. You are more likely to succeed when you do both.  If you are pregnant or breastfeeding:  Talk with your doctor about counseling or other ways to quit smoking.  Do not take medicine to help you quit smoking unless your doctor tells you to.  Quit right away  Quit smoking completely, instead of slowly cutting back on how much you smoke over a period of time. Stopping smoking right away may be more successful than slowly quitting.  Go to counseling. In-person is best if this is an option. You are more likely to quit if you go to counseling sessions regularly.  Take medicine  You may take medicines to help you quit. Some medicines need a prescription, and some you can buy over-the-counter. Some medicines may contain a drug called nicotine to replace the nicotine in cigarettes. Medicines may:  Help you stop having the desire to smoke (cravings).  Help to stop the problems that come when you stop smoking (withdrawal symptoms).  Your doctor may ask you to use:  Nicotine patches, gum, or lozenges.  Nicotine inhalers or  sprays.  Non-nicotine medicine that you take by mouth.  Find resources  Find resources and other ways to help you quit smoking and remain smoke-free after you quit. They include:  Online chats with a counselor.  Phone quitlines.  Printed self-help materials.  Support groups or group counseling.  Text messaging programs.  Mobile phone apps. Use apps on your mobile phone or tablet that can help you stick to your quit plan. Examples of free services include Quit Guide from the CDC and smokefree.gov    What can I do to make it easier to quit?    Talk to your family and friends. Ask them to support and encourage you.  Call a phone quitline, such as 7extraTKT800extraTKTQUIT-NOW, reach out to support groups, or work with a counselor.  Ask people who smoke to not smoke around you.  Avoid places that make you want to smoke, such as:  Bars.  Parties.  Smoke-break areas at work.  Spend time with people who do not smoke.  Lower the stress in your life. Stress can make you want to smoke. Try these things to lower stress:  Getting regular exercise.  Doing deep-breathing exercises.  Doing yoga.  Meditating.  What benefits will I see if I quit smoking?  Over time, you may have:  A better sense of smell and taste.  Less coughing and sore throat.  A slower heart rate.  Lower blood pressure.  Clearer skin.  Better breathing.  Fewer sick days.  Summary  Quitting smoking can be hard, but it is one of the best things that you can do for your health.  Do not give up if you cannot quit the first time. Some people need to try many times to quit.  When you decide to quit smoking, make a plan to help you succeed.  Quit smoking right away, not slowly over a period of time.  When you start quitting, get help and support to keep you smoke-free.  This information is not intended to replace advice given to you by your health care provider. Make sure you discuss any questions you have with your health care provider.  Document Revised: 12/09/2022 Document  Reviewed: 12/09/2022  Elsevier Patient Education © 2023 [employer]

## 2024-08-07 NOTE — PROGRESS NOTES
Subjective   Alexis Huntley is a 49 y.o. male. Presents today for   Chief Complaint   Patient presents with    [facility]    discuss meds    Annual Exam         HPI     No past medical history on file.    No past surgical history on file.     No Known Allergies    Current Outpatient Medications on File Prior to Visit   Medication Sig Dispense Refill    escitalopram (Lexapro) 10 MG tablet Take 1 tablet by mouth Daily. (Patient not taking: Reported on 8/7/2024) 60 tablet 0    methylPREDNISolone (MEDROL) 4 MG dose pack Take as directed on package instructions. (Patient not taking: Reported on 8/7/2024) 21 tablet 0    promethazine-dextromethorphan (PROMETHAZINE-DM) 6.25-15 MG/5ML syrup Take 5 mL by mouth 4 (Four) Times a Day As Needed for Cough. (Patient not taking: Reported on 8/7/2024) 118 mL 0     No current facility-administered medications on file prior to visit.       Social History     Socioeconomic History    Marital status: Single   Tobacco Use    Smoking status: Every Day     Current packs/day: 0.50     Average packs/day: 0.5 packs/day for 34.6 years (17.3 ttl pk-yrs)     Types: Cigarettes     Start date: 1990    Smokeless tobacco: Never   Vaping Use    Vaping status: Never Used   Substance and Sexual Activity    Alcohol use: Yes     Alcohol/week: 3.0 standard drinks of alcohol     Types: 2 Glasses of wine, 1 Cans of beer per week    Drug use: Never    Sexual activity: Defer       Occupation/Lives with: ***. Lives with ***    Sleep: Gets *** hours of sleep/night    Exercise: ***    Nutrition: ***    Caffeine: ***    Tobb/Vaping/Illicit Drugs/ETOH: ***    Sexual hx (#partners, m/f, bc, LMP): ***    Mood: ***    Safety: Feels safe at home with the people he/she is around.    Health Maintenance/Labs: ***    Last eye exam:     Last dentist visit:     Specialists: ***  ___________________________      Review of Systems   Denies dizziness, fatigue, fever/chills, CP, SOA, headache, blood in urine/stool, abd pain,  "leg swelling.    Objective   Vitals:    08/07/24 1055   BP: 126/74   Pulse: 99   SpO2: 97%   Weight: 99 kg (218 lb 3.2 oz)   Height: 185.4 cm (73\")     Body mass index is 28.79 kg/m².    Physical Exam    Procedures     Assessment & Plan   There are no diagnoses linked to this encounter.   Plan:     ***    {BMI is >= 25 and <30. (Overweight) The following options were offered after discussion;:[id number]}     No follow-ups on file.     Counseled on a healthy diet. Use condoms 100% of time with sex as IUD does not protect against STIs. Eat a healthy diet and exercise routinely. Avoid smoking/alcohol and drugs. Use seatbelt 100% of time.     Discussed Care Gaps, ordered referrals and encouraged vaccination updates.       - Pt agrees with plan of care and denies further questions/concerns today  - This document is intended for medical expert use only. Persons  reading this document without medical staff guidance may result in misinterpretation and unintended morbidity     Go to the ER for any possible life-threatening symptoms such as chest pain or shortness of air.      Please allow 3-5 business days for recommendations based on new results      I personally spent time with this patient, preparing for the visit, reviewing tests, obtaining and/or reviewing a separately obtained history, performing a medically appropriate examination and/or evaluation, counseling and educating the patient/family/caregiver, ordering medications,  documenting information in the medical record and indepentently interpreting results.         {Time Spent-Use for E/M Coding (Optional):61469}   "

## 2024-08-07 NOTE — PROGRESS NOTES
A [employer] message has been sent to the patient for PATIENT ROUNDING with [facility] [place] [facility].

## 2024-08-07 NOTE — PROGRESS NOTES
Subjective   Alexis Huntley is a 49 y.o. male.     Chief Complaint   Patient presents with    \[facility]\"" Care    discuss meds     History of Present Illness     Eczema: Patient has history of rash located on his lower back.  It was treated prior with Kenalog ointment and resolved for quite some time.  It has returned, and patient would like to treat with ointment again.    Current smoker: Patient is ready to quit smoking.  Would like to try Chantix.  Quit smoking, tried chantix but he forgot to take them.     Anxiety w/ panic attacks: Patient has history of anxiety with panic attacks.  He is going through divorce right now, his anxiety has flared.  He has been on Celexa and Lexapro in the past.  Both worked very well for him.  He has also had Ativan in the past.  He has an upcoming appointment with psychiatry.    Overweight: Patient wants to discuss weight loss options.  Called his insurance and they said that they do not cover Wegovy or Zepbound.  He would like to try phentermine with Topamax.  No prior cardiac history, no hypertension.    The following portions of the patient's history were reviewed and updated as appropriate: allergies, current medications, past family history, past medical history, past social history, past surgical history and problem list.    Review of Systems   Denies dizziness, fatigue, fever/chills, CP, SOA, headache, blood in urine/stool, abd pain, leg swelling.    Objective     Vitals:    08/07/24 1055   BP: 126/74   Pulse: 99   SpO2: 97%      Body mass index is 28.79 kg/m².    Physical Exam  Vitals reviewed.   Constitutional:       General: He is not in acute distress.     Appearance: Normal appearance. He is not ill-appearing or toxic-appearing.   HENT:      Right Ear: External ear normal.      Left Ear: External ear normal.      Nose: No congestion or rhinorrhea.      Mouth/Throat:      Mouth: Mucous membranes are moist.      Pharynx: Oropharynx is clear.   Eyes:       Conjunctiva/sclera: Conjunctivae normal.   Cardiovascular:      Rate and Rhythm: Normal rate and regular rhythm.      Pulses: Normal pulses.      Heart sounds: Normal heart sounds.   Pulmonary:      Effort: Pulmonary effort is normal. No respiratory distress.      Breath sounds: Normal breath sounds.   Abdominal:      General: Bowel sounds are normal.      Palpations: Abdomen is soft.      Tenderness: There is no abdominal tenderness.   Skin:     General: Skin is warm and dry.      Capillary Refill: Capillary refill takes less than 2 seconds.   Neurological:      General: No focal deficit present.      Mental Status: He is alert and oriented to person, place, and time.      Motor: No weakness.   Psychiatric:         Behavior: Behavior normal.       Assessment & Plan   Diagnoses and all orders for this visit:    1. Encounter to establish care (Primary)    2. Eczema, unspecified type  -     triamcinolone (KENALOG) 0.1 % ointment; Apply 1 Application topically to the appropriate area as directed 2 (Two) Times a Day.  Dispense: 30 g; Refill: 0    3. Current smoker, ready to quit  -     Varenicline Tartrate, Starter, 0.5 MG X 11 & 1 MG X 42 tablet therapy pack; Take 0.5 mg by mouth Daily for 3 days, THEN 0.5 mg 2 (Two) Times a Day for 4 days, THEN 1 mg 2 (Two) Times a Day for 21 days. Take 0.5 mg po daily x 3 days, then 0.5 mg po bid x 4 days, then 1 mg po bid  Dispense: 1 each; Refill: 0  -     varenicline (Chantix Continuing Month Marcial) 1 MG tablet; Take 1 tablet by mouth 2 (Two) Times a Day for 56 days.  Dispense: 56 tablet; Refill: 1    4. Generalized anxiety disorder with panic attacks  -     citalopram (CeleXA) 10 MG tablet; Take 0.5 tablets by mouth Daily for 7 days, THEN 1 tablet Daily for 90 days.  Dispense: 94 tablet; Refill: 0    5. Overweight (BMI 25.0-29.9)    6. Need for pneumococcal 20-valent conjugate vaccination  -     Pneumococcal Conjugate Vaccine 20-Valent (PCV20)    7. Need for Tdap vaccination  -      Tdap Vaccine => 8yo IM (BOOSTRIX/ADACEL)      Plan:     Use Kenalog ointment twice a day, no longer than 2 weeks.  Take Chantix as prescribed.  Notify provider of issues.  Start Celexa 10 mg.  Take 5 mg x 7 days, then increase to 1 tablet daily.  Will pend phentermine and Topamax to Dr. Sarkar.  Return in 1 month for follow-up and annual physical plus labs.    Discussed Care Gaps, ordered referrals and encouraged vaccination updates.       - Pt agrees with plan of care and denies further questions/concerns today  - This document is intended for medical expert use only. Persons  reading this document without medical staff guidance may result in misinterpretation and unintended morbidity     Go to the ER for any possible life-threatening symptoms such as chest pain or shortness of air.      Please allow 3-5 business days for recommendations based on new results      I personally spent time with this patient, preparing for the visit, reviewing tests, obtaining and/or reviewing a separately obtained history, performing a medically appropriate examination and/or evaluation, counseling and educating the patient/family/caregiver, ordering medications,  documenting information in the medical record and indepentently interpreting results.

## 2024-09-09 ENCOUNTER — OFFICE VISIT (OUTPATIENT)
Dept: FAMILY MEDICINE CLINIC | Facility: CLINIC | Age: 49
End: 2024-09-09
Payer: COMMERCIAL

## 2024-09-09 VITALS
HEIGHT: 73 IN | BODY MASS INDEX: 27.73 KG/M2 | DIASTOLIC BLOOD PRESSURE: 72 MMHG | HEART RATE: 99 BPM | SYSTOLIC BLOOD PRESSURE: 108 MMHG | WEIGHT: 209.2 LBS | OXYGEN SATURATION: 97 %

## 2024-09-09 DIAGNOSIS — R53.83 OTHER FATIGUE: ICD-10-CM

## 2024-09-09 DIAGNOSIS — Z00.00 ANNUAL PHYSICAL EXAM: Primary | ICD-10-CM

## 2024-09-09 DIAGNOSIS — Z13.220 NEED FOR LIPID SCREENING: ICD-10-CM

## 2024-09-09 PROCEDURE — 99396 PREV VISIT EST AGE 40-64: CPT

## 2024-09-09 NOTE — PATIENT INSTRUCTIONS

## 2024-09-09 NOTE — PROGRESS NOTES
"Subjective   Alexis Huntley is a 49 y.o. male. Presents today for   Chief Complaint   Patient presents with    Annual Exam         HPI     History reviewed. No pertinent past medical history.    History reviewed. No pertinent surgical history.     No Known Allergies    Current Outpatient Medications on File Prior to Visit   Medication Sig Dispense Refill    citalopram (CeleXA) 10 MG tablet Take 0.5 tablets by mouth Daily for 7 days, THEN 1 tablet Daily for 90 days. 94 tablet 0    triamcinolone (KENALOG) 0.1 % ointment Apply 1 Application topically to the appropriate area as directed 2 (Two) Times a Day. 30 g 0    varenicline (Chantix Continuing Month Marcial) 1 MG tablet Take 1 tablet by mouth 2 (Two) Times a Day for 56 days. 56 tablet 1     No current facility-administered medications on file prior to visit.       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    Safety: Feels safe at home with the people he/she is around.    Health Maintenance/Labs: Due    Last eye exam: 1 year " "ago    Last dentist visit: 2 years ago    Specialists: sleep medicine, upcoming appt with psych?  ___________________________  Review of Systems   Denies dizziness, fever/chills, CP, SOA, headache, blood in urine/stool, abd pain, leg swelling.    Objective   Vitals:    09/09/24 1125   BP: 108/72   Pulse: 99   SpO2: 97%   Weight: 94.9 kg (209 lb 3.2 oz)   Height: 185.4 cm (73\")     Body mass index is 27.6 kg/m².    Physical Exam  Vitals reviewed.   Constitutional:       General: He is not in acute distress.     Appearance: Normal appearance. He is not ill-appearing or toxic-appearing.   HENT:      Right Ear: Tympanic membrane, ear canal and external ear normal.      Left Ear: Tympanic membrane, ear canal and external ear normal.      Nose: No congestion or rhinorrhea.      Mouth/Throat:      Mouth: Mucous membranes are moist.      Pharynx: Oropharynx is clear. No oropharyngeal exudate or posterior oropharyngeal erythema.   Eyes:      Extraocular Movements: Extraocular movements intact.      Conjunctiva/sclera: Conjunctivae normal.      Pupils: Pupils are equal, round, and reactive to light.   Cardiovascular:      Rate and Rhythm: Normal rate.      Pulses: Normal pulses.      Heart sounds: Normal heart sounds.   Pulmonary:      Effort: Pulmonary effort is normal. No respiratory distress.      Breath sounds: Normal breath sounds.   Abdominal:      General: Bowel sounds are normal.      Palpations: Abdomen is soft.      Tenderness: There is no abdominal tenderness.   Skin:     General: Skin is warm and dry.      Capillary Refill: Capillary refill takes less than 2 seconds.   Neurological:      General: No focal deficit present.      Mental Status: He is alert and oriented to person, place, and time.      Motor: No weakness.   Psychiatric:         Behavior: Behavior normal.       Assessment & Plan   Diagnoses and all orders for this visit:    1. Annual physical exam (Primary)    2. Other fatigue  -     CBC w AUTO " Differential; Future  -     Comprehensive metabolic panel; Future  -     Testosterone (Free & Total), LC / MS; Future    3. Need for lipid screening  -     Lipid panel; Future       Plan:     Complete labs today in office, or as soon as possible    Return in about 6 months (around 3/9/2025) for Recheck.     Counseled on a healthy diet. Use condoms 100% of time with sex as IUD does not protect against STIs. Eat a healthy diet and exercise routinely. Avoid smoking/alcohol and drugs. Use seatbelt 100% of time.     Discussed Care Gaps, ordered referrals and encouraged vaccination updates.       - Pt agrees with plan of care and denies further questions/concerns today  - This document is intended for medical expert use only. Persons  reading this document without medical staff guidance may result in misinterpretation and unintended morbidity     Go to the ER for any possible life-threatening symptoms such as chest pain or shortness of air.      Please allow 3-5 business days for recommendations based on new results      I personally spent time with this patient, preparing for the visit, reviewing tests, obtaining and/or reviewing a separately obtained history, performing a medically appropriate examination and/or evaluation, counseling and educating the patient/family/caregiver, ordering medications,  documenting information in the medical record and indepentently interpreting results.

## 2024-11-02 DIAGNOSIS — F41.0 GENERALIZED ANXIETY DISORDER WITH PANIC ATTACKS: ICD-10-CM

## 2024-11-02 DIAGNOSIS — F41.1 GENERALIZED ANXIETY DISORDER WITH PANIC ATTACKS: ICD-10-CM

## 2024-11-04 RX ORDER — CITALOPRAM HYDROBROMIDE 10 MG/1
10 TABLET ORAL EVERY MORNING
Qty: 90 TABLET | Refills: 0 | Status: SHIPPED | OUTPATIENT
Start: 2024-11-04

## 2025-02-11 DIAGNOSIS — F41.1 GENERALIZED ANXIETY DISORDER WITH PANIC ATTACKS: ICD-10-CM

## 2025-02-11 DIAGNOSIS — F41.0 GENERALIZED ANXIETY DISORDER WITH PANIC ATTACKS: ICD-10-CM

## 2025-02-11 RX ORDER — CITALOPRAM HYDROBROMIDE 10 MG/1
10 TABLET ORAL EVERY MORNING
Qty: 90 TABLET | Refills: 0 | Status: SHIPPED | OUTPATIENT
Start: 2025-02-11

## 2025-02-25 ENCOUNTER — E-VISIT (OUTPATIENT)
Dept: FAMILY MEDICINE CLINIC | Facility: TELEHEALTH | Age: 50
End: 2025-02-25
Payer: COMMERCIAL

## 2025-02-25 PROCEDURE — FABRICHEALTHVISIT: Performed by: NURSE PRACTITIONER

## 2025-02-26 NOTE — E-VISIT TREATED
Date: 2025 21:56:39  Clinician: Annemarie Arriaza  Clinician NPI: [id number]  Patient: Alexis Huntley  Patient : 1975  Patient Address: [address] [place] [address]  Patient Phone: [phone]  Visit Protocol: URI  Patient Summary:  Alexis is a 50 year old ( : 1975 ) male who initiated a visit for cold, sinus infection, or influenza.     Alexis states the symptoms started 1-2 days ago.   Symptom start date: 2025   The symptoms consist of facial   pain or pressure, malaise, myalgia, nasal congestion, ear pain, and a headache.   Symptom details     Nasal secretions: The color of the mucus is clear.    Facial pain or pressure: The facial pain or pressure feels worse when bending over or leaning forward.     Headache: The headache is moderate (4-6 on a 10 point pain scale).      Alexis denies having chills, diarrhea, teeth pain, nausea, rhinitis, vomiting, fever, sore throat, anosmia and ageusia, wheezing, and cough. Alexis also denies having recent facial or sinus surgery in the past 60 days and taking antibiotic medication   in the past month. Alexis is not experiencing dyspnea.   Precipitating events  Alexis has not recently been exposed to someone with influenza. lAexis has not been in close contact with any high risk individuals.    Alexis has received the influenza   vaccine more than 2 weeks ago.   Pertinent COVID-19 (Coronavirus) information  Since the symptoms started, Alexis has tested for COVID-19. The result of the test was negative. The negative result was within the last 48 hours.   Alexis has not received   a COVID-19 vaccine in the past year.   Pertinent medical history     A provider has not told Alexis to avoid NSAIDs.   Alexis does not have diabetes. Alexis denies having immunosuppressive conditions (e.g., chemotherapy, HIV, organ transplant,   long-term use of steroids or other immunosuppressive medications, splenectomy). Alexis  denies having severe COPD, heart disease, and congestive heart failure. Alexis does not have asthma.   Alexis denies having chronic lung disease, cystic fibrosis,   hypertension, long-term disabilities, mental health conditions, sickle cell disease or thalassemia, stroke or other cardiovascular disease, substance use disorders, or tuberculosis (TB).  Alexis does not smoke or use smokeless tobacco. Alexis does not   vape or use other e-cigarette products.   Additional information as reported by the patient (free text): Sinus pressure sore to the touch under left eye and cheek and temple . Periodic crackling in my left ear only pain on the left side   Weight: 215 lbs   (97.52 kg)    MEDICATIONS: citalopram oral, varenicline tartrate oral, triamcinolone acetonide topical, varenicline tartrate oral, ALLERGIES: NKDA  Clinician Response:  Dear Alexis,  Based on the information provided, you have viral sinusitis, also known as a sinus infection. Most cases of sinus infections are caused by viruses and symptoms start to improve on their own in 7-10 days. Both viral and   bacterial sinus infections usually resolve on its own. A bacterial infection may have developed if any of the following apply to you.      Symptoms of sinus infection lasting 10 days or more without showing any improvement    If you feel better after a viral upper respiratory infection such as, a cold but then suddenly feel worse with symptoms such as fever, headache, or increase in nasal discharge     Medication information  Because you have a viral infection, antibiotics will not help you get better. Treating a viral infection with antibiotics could actually make you feel worse.  For more information on why I am not prescribing antibiotics, please   watch this video: Antibiotics Aren't Always the Answer.  I am prescribing:     Fluticasone 50 mcg/actuation nasal spray. Spray twice in each nostril 1 time per day; after 1 week, may adjust to 1 - 2  sprays in each nostril 1 time per day. This medication takes several days to start working, so keep taking it even if it doesn't help   right away. There are no refills with this prescription.   Tips for using a nasal spray:     When the medication is being sprayed in your nose, point the tip of the nasal spray towards your ear.    Do not blow your nose after using the spray.    Do not lean your head back after using the spray as it will go down your throat.    Wipe the tip of the nose piece after use with a dry, clean tissue.     Self care  Steps you can take to be as comfortable as possible:     Rest.    Drink plenty of fluids.    Take a warm shower to loosen congestion.    Use a cool-mist humidifier.     When to seek care  Please be seen in a clinic or urgent care if any of the following occur:   New symptoms develop, or symptoms become worse   Call 911 or go to the emergency room if any of the following occur:     If you feel that your throat is closing off    Suddenly develop a rash    Unable to swallow fluids or are drooling     For the latest updates on COVID-19 (Coronavirus), please visit the Centers for Disease Control and Prevention (CDC). Also, your state and local health department websites may provide additional guidance regarding testing and isolation recommendations   for your location.   Diagnosis: Viral sinusitis  Diagnosis ICD: J01.90    Follow up instructions: ATTENTION: If you have been prescribed medications, your prescriptions will not be sent until you choose your pharmacy.  To do so open the link within your notification, or go to [employer] and click eVisit in the menu to open your   treatment plan. From there, you can select your pharmacy at the bottom of your after visit summary. You can also go to [email or web address]  Prescriptions  Prescription: fluticasone 50 mcg/actuation nasal spray,suspension, spray twice in each nostril 1 time per day; after 1 week, may  adjust to 1 - 2 sprays in each nostril 1 time per day.  Sent To: [facility] #[id number] - [id number] - [address] [place] [address]  Prescription: prednisone 20 mg oral tablet, take 1 tablet by mouth 2 times per day for 5 days  Sent To: [facility] #[id number] - [id number] - [address] [place] [address]

## 2025-05-19 DIAGNOSIS — F41.0 GENERALIZED ANXIETY DISORDER WITH PANIC ATTACKS: ICD-10-CM

## 2025-05-19 DIAGNOSIS — F41.1 GENERALIZED ANXIETY DISORDER WITH PANIC ATTACKS: ICD-10-CM

## 2025-05-19 RX ORDER — CITALOPRAM HYDROBROMIDE 10 MG/1
10 TABLET ORAL EVERY MORNING
Qty: 30 TABLET | Refills: 0 | Status: SHIPPED | OUTPATIENT
Start: 2025-05-19

## 2025-06-23 DIAGNOSIS — F41.0 GENERALIZED ANXIETY DISORDER WITH PANIC ATTACKS: ICD-10-CM

## 2025-06-23 DIAGNOSIS — F41.1 GENERALIZED ANXIETY DISORDER WITH PANIC ATTACKS: ICD-10-CM

## 2025-06-23 RX ORDER — CITALOPRAM HYDROBROMIDE 10 MG/1
10 TABLET ORAL EVERY MORNING
Qty: 30 TABLET | Refills: 2 | Status: SHIPPED | OUTPATIENT
Start: 2025-06-23